# Patient Record
Sex: MALE | Race: WHITE | NOT HISPANIC OR LATINO | Employment: OTHER | URBAN - METROPOLITAN AREA
[De-identification: names, ages, dates, MRNs, and addresses within clinical notes are randomized per-mention and may not be internally consistent; named-entity substitution may affect disease eponyms.]

---

## 2017-03-21 ENCOUNTER — HISTORICAL (OUTPATIENT)
Dept: LAB | Facility: HOSPITAL | Age: 67
End: 2017-03-21

## 2018-05-22 ENCOUNTER — HISTORICAL (OUTPATIENT)
Dept: LAB | Facility: HOSPITAL | Age: 68
End: 2018-05-22

## 2018-05-22 LAB
ABS NEUT (OLG): 2.23 X10(3)/MCL (ref 2.1–9.2)
ALBUMIN SERPL-MCNC: 4.3 GM/DL (ref 3.4–5)
ALBUMIN/GLOB SERPL: 1.3 {RATIO}
ALP SERPL-CCNC: 62 UNIT/L (ref 50–136)
ALT SERPL-CCNC: 23 UNIT/L (ref 12–78)
AST SERPL-CCNC: 26 UNIT/L (ref 15–37)
BASOPHILS # BLD AUTO: 0 X10(3)/MCL (ref 0–0.2)
BASOPHILS NFR BLD AUTO: 0 %
BILIRUB SERPL-MCNC: 0.4 MG/DL (ref 0.2–1)
BILIRUBIN DIRECT+TOT PNL SERPL-MCNC: 0.1 MG/DL (ref 0–0.2)
BILIRUBIN DIRECT+TOT PNL SERPL-MCNC: 0.3 MG/DL (ref 0–0.8)
BUN SERPL-MCNC: 27 MG/DL (ref 7–18)
CALCIUM SERPL-MCNC: 8.6 MG/DL (ref 8.5–10.1)
CHLORIDE SERPL-SCNC: 105 MMOL/L (ref 98–107)
CO2 SERPL-SCNC: 28 MMOL/L (ref 21–32)
CREAT SERPL-MCNC: 1.1 MG/DL (ref 0.7–1.3)
DEPRECATED CALCIDIOL+CALCIFEROL SERPL-MC: 37 NG/ML (ref 30–80)
EOSINOPHIL # BLD AUTO: 0.2 X10(3)/MCL (ref 0–0.9)
EOSINOPHIL NFR BLD AUTO: 5 %
ERYTHROCYTE [DISTWIDTH] IN BLOOD BY AUTOMATED COUNT: 13.8 % (ref 11.5–17)
EST. AVERAGE GLUCOSE BLD GHB EST-MCNC: 111 MG/DL
GLOBULIN SER-MCNC: 3.3 GM/DL (ref 2.4–3.5)
GLUCOSE SERPL-MCNC: 111 MG/DL (ref 74–106)
HBA1C MFR BLD: 5.5 % (ref 4.2–6.3)
HCT VFR BLD AUTO: 48.3 % (ref 42–52)
HGB BLD-MCNC: 14.8 GM/DL (ref 14–18)
LYMPHOCYTES # BLD AUTO: 1.5 X10(3)/MCL (ref 0.6–4.6)
LYMPHOCYTES NFR BLD AUTO: 34 %
MCH RBC QN AUTO: 29.9 PG (ref 27–31)
MCHC RBC AUTO-ENTMCNC: 30.6 GM/DL (ref 33–36)
MCV RBC AUTO: 97.6 FL (ref 80–94)
MONOCYTES # BLD AUTO: 0.4 X10(3)/MCL (ref 0.1–1.3)
MONOCYTES NFR BLD AUTO: 10 %
NEUTROPHILS # BLD AUTO: 2.23 X10(3)/MCL (ref 2.1–9.2)
NEUTROPHILS NFR BLD AUTO: 51 %
PLATELET # BLD AUTO: 149 X10(3)/MCL (ref 130–400)
PMV BLD AUTO: 12 FL (ref 9.4–12.4)
POTASSIUM SERPL-SCNC: 4.5 MMOL/L (ref 3.5–5.1)
PROT SERPL-MCNC: 7.6 GM/DL (ref 6.4–8.2)
PSA SERPL-MCNC: 0.7 NG/ML (ref 0–4)
RBC # BLD AUTO: 4.95 X10(6)/MCL (ref 4.7–6.1)
SODIUM SERPL-SCNC: 142 MMOL/L (ref 136–145)
T4 FREE SERPL-MCNC: 0.69 NG/DL (ref 0.76–1.46)
TSH SERPL-ACNC: 3.21 MIU/L (ref 0.36–3.74)
WBC # SPEC AUTO: 4.4 X10(3)/MCL (ref 4.5–11.5)

## 2018-07-10 LAB — RAPID GROUP A STREP (OHS): NEGATIVE

## 2019-06-20 ENCOUNTER — HISTORICAL (OUTPATIENT)
Dept: LAB | Facility: HOSPITAL | Age: 69
End: 2019-06-20

## 2019-06-20 LAB
ABS NEUT (OLG): 2.43 X10(3)/MCL (ref 2.1–9.2)
ALBUMIN SERPL-MCNC: 4 GM/DL (ref 3.4–5)
ALBUMIN/GLOB SERPL: 1 {RATIO}
ALP SERPL-CCNC: 60 UNIT/L (ref 45–117)
ALT SERPL-CCNC: 24 UNIT/L (ref 16–61)
AST SERPL-CCNC: 30 UNIT/L (ref 15–37)
BASOPHILS # BLD AUTO: 0.02 X10(3)/MCL (ref 0–0.2)
BASOPHILS NFR BLD AUTO: 0.4 % (ref 0–0.9)
BILIRUB SERPL-MCNC: 0.4 MG/DL (ref 0.2–1)
BILIRUBIN DIRECT+TOT PNL SERPL-MCNC: 0.11 MG/DL (ref 0–0.2)
BILIRUBIN DIRECT+TOT PNL SERPL-MCNC: 0.29 MG/DL (ref 0–1)
BUN SERPL-MCNC: 27 MG/DL (ref 7–18)
CALCIUM SERPL-MCNC: 8.7 MG/DL (ref 8.5–10.1)
CHLORIDE SERPL-SCNC: 106 MMOL/L (ref 98–107)
CHOLEST SERPL-MCNC: 202 MG/DL (ref 0–199)
CHOLEST/HDLC SERPL: 4 MG/DL (ref 0–8)
CO2 SERPL-SCNC: 26 MMOL/L (ref 21–32)
CREAT SERPL-MCNC: 1.07 MG/DL (ref 0.7–1.3)
EOSINOPHIL # BLD AUTO: 0.36 X10(3)/MCL (ref 0–0.9)
EOSINOPHIL NFR BLD AUTO: 7.2 % (ref 0–6.5)
ERYTHROCYTE [DISTWIDTH] IN BLOOD BY AUTOMATED COUNT: 13.4 % (ref 11.5–17)
EST. AVERAGE GLUCOSE BLD GHB EST-MCNC: 100 MG/DL
GLOBULIN SER-MCNC: 4 GM/DL (ref 2–4)
GLUCOSE SERPL-MCNC: 105 MG/DL (ref 74–106)
HBA1C MFR BLD: 5.1 % (ref 4.2–6.3)
HCT VFR BLD AUTO: 45.1 % (ref 42–52)
HDLC SERPL-MCNC: 48 MG/DL
HGB BLD-MCNC: 15.4 GM/DL (ref 14–18)
IMM GRANULOCYTES # BLD AUTO: 0.01 10*3/UL (ref 0–0.02)
IMM GRANULOCYTES NFR BLD AUTO: 0.2 % (ref 0–0.43)
LDLC SERPL CALC-MCNC: 87 MG/DL (ref 0–129)
LYMPHOCYTES # BLD AUTO: 1.69 X10(3)/MCL (ref 0.6–4.6)
LYMPHOCYTES NFR BLD AUTO: 34 % (ref 16.2–38.3)
MCH RBC QN AUTO: 32.5 PG (ref 27–31)
MCHC RBC AUTO-ENTMCNC: 34.1 GM/DL (ref 33–36)
MCV RBC AUTO: 95.1 FL (ref 80–94)
MONOCYTES # BLD AUTO: 0.46 X10(3)/MCL (ref 0.1–1.3)
MONOCYTES NFR BLD AUTO: 9.3 % (ref 4.7–11.3)
NEUTROPHILS # BLD AUTO: 2.43 X10(3)/MCL (ref 2.1–9.2)
NEUTROPHILS NFR BLD AUTO: 48.9 % (ref 49.1–73.4)
NRBC BLD AUTO-RTO: 0 % (ref 0–0.2)
PLATELET # BLD AUTO: 179 X10(3)/MCL (ref 130–400)
PMV BLD AUTO: 10.6 FL (ref 7.4–10.4)
POTASSIUM SERPL-SCNC: 4.6 MMOL/L (ref 3.5–5.1)
PROT SERPL-MCNC: 7.9 GM/DL (ref 6.4–8.2)
RBC # BLD AUTO: 4.74 X10(6)/MCL (ref 4.7–6.1)
SODIUM SERPL-SCNC: 139 MMOL/L (ref 136–145)
TRIGL SERPL-MCNC: 337 MG/DL (ref 0–149)
TSH SERPL-ACNC: 1.58 MIU/ML (ref 0.36–3.74)
VLDLC SERPL CALC-MCNC: 67 MG/DL
WBC # SPEC AUTO: 5 X10(3)/MCL (ref 4.5–11.5)

## 2020-06-18 ENCOUNTER — HISTORICAL (OUTPATIENT)
Dept: LAB | Facility: HOSPITAL | Age: 70
End: 2020-06-18

## 2020-06-18 LAB
ABS NEUT (OLG): 2.87 X10(3)/MCL (ref 2.1–9.2)
ALBUMIN SERPL-MCNC: 4.1 GM/DL (ref 3.4–4.8)
ALBUMIN/GLOB SERPL: 1.2 RATIO (ref 1.1–2)
ALP SERPL-CCNC: 62 UNIT/L (ref 40–150)
ALT SERPL-CCNC: 19 UNIT/L (ref 0–55)
AST SERPL-CCNC: 28 UNIT/L (ref 5–34)
BASOPHILS # BLD AUTO: 0.02 X10(3)/MCL (ref 0–0.2)
BASOPHILS NFR BLD AUTO: 0.4 % (ref 0–0.9)
BILIRUB SERPL-MCNC: 0.5 MG/DL (ref 0.2–1.2)
BILIRUBIN DIRECT+TOT PNL SERPL-MCNC: 0.2 MG/DL (ref 0–0.5)
BILIRUBIN DIRECT+TOT PNL SERPL-MCNC: 0.3 MG/DL (ref 0–0.8)
BUN SERPL-MCNC: 22.5 MG/DL (ref 8.4–25.7)
CALCIUM SERPL-MCNC: 9.1 MG/DL (ref 8.8–10)
CHLORIDE SERPL-SCNC: 101 MMOL/L (ref 98–107)
CHOLEST SERPL-MCNC: 168 MG/DL
CHOLEST/HDLC SERPL: 4 {RATIO} (ref 0–5)
CO2 SERPL-SCNC: 26 MMOL/L (ref 23–31)
CREAT SERPL-MCNC: 0.95 MG/DL (ref 0.72–1.25)
DEPRECATED CALCIDIOL+CALCIFEROL SERPL-MC: 49.9 NG/ML (ref 6.6–49.9)
EOSINOPHIL # BLD AUTO: 0.25 X10(3)/MCL (ref 0–0.9)
EOSINOPHIL NFR BLD AUTO: 5.3 % (ref 0–6.5)
ERYTHROCYTE [DISTWIDTH] IN BLOOD BY AUTOMATED COUNT: 12.6 % (ref 11.5–17)
EST. AVERAGE GLUCOSE BLD GHB EST-MCNC: 111.2 MG/DL
GLOBULIN SER-MCNC: 3.5 GM/DL (ref 2.4–3.5)
GLUCOSE SERPL-MCNC: 118 MG/DL (ref 82–115)
HBA1C MFR BLD: 5.5 %
HCT VFR BLD AUTO: 43.7 % (ref 42–52)
HDLC SERPL-MCNC: 45 MG/DL (ref 40–60)
HGB BLD-MCNC: 14.8 GM/DL (ref 14–18)
IMM GRANULOCYTES # BLD AUTO: 0 10*3/UL (ref 0–0.02)
IMM GRANULOCYTES NFR BLD AUTO: 0 % (ref 0–0.43)
LDLC SERPL CALC-MCNC: 89 MG/DL (ref 50–140)
LYMPHOCYTES # BLD AUTO: 1.29 X10(3)/MCL (ref 0.6–4.6)
LYMPHOCYTES NFR BLD AUTO: 27.2 % (ref 16.2–38.3)
MCH RBC QN AUTO: 31.4 PG (ref 27–31)
MCHC RBC AUTO-ENTMCNC: 33.9 GM/DL (ref 33–36)
MCV RBC AUTO: 92.8 FL (ref 80–94)
MONOCYTES # BLD AUTO: 0.31 X10(3)/MCL (ref 0.1–1.3)
MONOCYTES NFR BLD AUTO: 6.5 % (ref 4.7–11.3)
NEUTROPHILS # BLD AUTO: 2.87 X10(3)/MCL (ref 2.1–9.2)
NEUTROPHILS NFR BLD AUTO: 60.6 % (ref 49.1–73.4)
NRBC BLD AUTO-RTO: 0 % (ref 0–0.2)
PLATELET # BLD AUTO: 162 X10(3)/MCL (ref 130–400)
PMV BLD AUTO: 10.7 FL (ref 7.4–10.4)
POTASSIUM SERPL-SCNC: 4.2 MMOL/L (ref 3.5–5.1)
PROT SERPL-MCNC: 7.6 GM/DL (ref 5.8–7.6)
PSA SERPL-MCNC: 0.36 NG/ML
RBC # BLD AUTO: 4.71 X10(6)/MCL (ref 4.7–6.1)
SODIUM SERPL-SCNC: 140 MMOL/L (ref 136–145)
TRIGL SERPL-MCNC: 169 MG/DL (ref 0–150)
TSH SERPL-ACNC: 1.7 UIU/ML (ref 0.35–4.94)
VLDLC SERPL CALC-MCNC: 34 MG/DL
WBC # SPEC AUTO: 4.7 X10(3)/MCL (ref 4.5–11.5)

## 2021-07-01 ENCOUNTER — HISTORICAL (OUTPATIENT)
Dept: LAB | Facility: HOSPITAL | Age: 71
End: 2021-07-01

## 2021-07-01 LAB
ABS NEUT (OLG): 3.17 X10(3)/MCL (ref 2.1–9.2)
ALBUMIN SERPL-MCNC: 4.4 GM/DL (ref 3.4–4.8)
ALBUMIN/GLOB SERPL: 1.3 RATIO (ref 1.1–2)
ALP SERPL-CCNC: 59 UNIT/L (ref 40–150)
ALT SERPL-CCNC: 11 UNIT/L (ref 0–55)
AST SERPL-CCNC: 22 UNIT/L (ref 5–34)
BASOPHILS # BLD AUTO: 0.01 X10(3)/MCL (ref 0–0.2)
BASOPHILS NFR BLD AUTO: 0.2 % (ref 0–0.9)
BILIRUB SERPL-MCNC: 0.6 MG/DL (ref 0.2–1.2)
BILIRUBIN DIRECT+TOT PNL SERPL-MCNC: 0.2 MG/DL (ref 0–0.5)
BILIRUBIN DIRECT+TOT PNL SERPL-MCNC: 0.4 MG/DL (ref 0–0.8)
BUN SERPL-MCNC: 12.4 MG/DL (ref 8.4–25.7)
CALCIUM SERPL-MCNC: 9.8 MG/DL (ref 8.8–10)
CHLORIDE SERPL-SCNC: 103 MMOL/L (ref 98–107)
CHOLEST SERPL-MCNC: 157 MG/DL
CHOLEST/HDLC SERPL: 3 {RATIO} (ref 0–5)
CO2 SERPL-SCNC: 27 MMOL/L (ref 23–31)
CREAT SERPL-MCNC: 0.93 MG/DL (ref 0.72–1.25)
DEPRECATED CALCIDIOL+CALCIFEROL SERPL-MC: 28.5 NG/ML (ref 30–80)
EOSINOPHIL # BLD AUTO: 0.4 X10(3)/MCL (ref 0–0.9)
EOSINOPHIL NFR BLD AUTO: 6.7 % (ref 0–6.5)
ERYTHROCYTE [DISTWIDTH] IN BLOOD BY AUTOMATED COUNT: 12.4 % (ref 11.5–17)
EST. AVERAGE GLUCOSE BLD GHB EST-MCNC: 102.5 MG/DL
GLOBULIN SER-MCNC: 3.3 GM/DL (ref 2.4–3.5)
GLUCOSE SERPL-MCNC: 104 MG/DL (ref 82–115)
HBA1C MFR BLD: 5.2 %
HCT VFR BLD AUTO: 45.7 % (ref 42–52)
HDLC SERPL-MCNC: 53 MG/DL (ref 40–60)
HGB BLD-MCNC: 15.3 GM/DL (ref 14–18)
IMM GRANULOCYTES # BLD AUTO: 0.01 10*3/UL (ref 0–0.02)
IMM GRANULOCYTES NFR BLD AUTO: 0.2 % (ref 0–0.43)
LDLC SERPL CALC-MCNC: 87 MG/DL (ref 50–140)
LYMPHOCYTES # BLD AUTO: 1.92 X10(3)/MCL (ref 0.6–4.6)
LYMPHOCYTES NFR BLD AUTO: 32.1 % (ref 16.2–38.3)
MCH RBC QN AUTO: 31.7 PG (ref 27–31)
MCHC RBC AUTO-ENTMCNC: 33.5 GM/DL (ref 33–36)
MCV RBC AUTO: 94.8 FL (ref 80–94)
MONOCYTES # BLD AUTO: 0.48 X10(3)/MCL (ref 0.1–1.3)
MONOCYTES NFR BLD AUTO: 8 % (ref 4.7–11.3)
NEUTROPHILS # BLD AUTO: 3.17 X10(3)/MCL (ref 2.1–9.2)
NEUTROPHILS NFR BLD AUTO: 52.8 % (ref 49.1–73.4)
NRBC BLD AUTO-RTO: 0 % (ref 0–0.2)
PLATELET # BLD AUTO: 204 X10(3)/MCL (ref 130–400)
PMV BLD AUTO: 10.2 FL (ref 7.4–10.4)
POTASSIUM SERPL-SCNC: 4 MMOL/L (ref 3.5–5.1)
PROT SERPL-MCNC: 7.7 GM/DL (ref 5.8–7.6)
PSA SERPL-MCNC: 0.49 NG/ML
RBC # BLD AUTO: 4.82 X10(6)/MCL (ref 4.7–6.1)
SODIUM SERPL-SCNC: 142 MMOL/L (ref 136–145)
TRIGL SERPL-MCNC: 86 MG/DL (ref 0–150)
TSH SERPL-ACNC: 1.79 UIU/ML (ref 0.35–4.94)
VLDLC SERPL CALC-MCNC: 17 MG/DL
WBC # SPEC AUTO: 6 X10(3)/MCL (ref 4.5–11.5)

## 2022-04-10 ENCOUNTER — HISTORICAL (OUTPATIENT)
Dept: ADMINISTRATIVE | Facility: HOSPITAL | Age: 72
End: 2022-04-10

## 2022-04-27 VITALS
SYSTOLIC BLOOD PRESSURE: 190 MMHG | HEIGHT: 66 IN | DIASTOLIC BLOOD PRESSURE: 94 MMHG | WEIGHT: 156.06 LBS | OXYGEN SATURATION: 97 % | BODY MASS INDEX: 25.08 KG/M2

## 2022-06-01 ENCOUNTER — TELEPHONE (OUTPATIENT)
Dept: ADMINISTRATIVE | Facility: HOSPITAL | Age: 72
End: 2022-06-01
Payer: MEDICARE

## 2022-06-09 ENCOUNTER — OFFICE VISIT (OUTPATIENT)
Dept: FAMILY MEDICINE | Facility: CLINIC | Age: 72
End: 2022-06-09
Payer: MEDICARE

## 2022-06-09 VITALS
OXYGEN SATURATION: 97 % | HEIGHT: 65 IN | BODY MASS INDEX: 25.49 KG/M2 | WEIGHT: 153 LBS | RESPIRATION RATE: 16 BRPM | SYSTOLIC BLOOD PRESSURE: 124 MMHG | TEMPERATURE: 98 F | HEART RATE: 76 BPM | DIASTOLIC BLOOD PRESSURE: 74 MMHG

## 2022-06-09 DIAGNOSIS — Z09 HOSPITAL DISCHARGE FOLLOW-UP: Primary | ICD-10-CM

## 2022-06-09 DIAGNOSIS — S22.42XA CLOSED FRACTURE OF MULTIPLE RIBS OF LEFT SIDE, INITIAL ENCOUNTER: ICD-10-CM

## 2022-06-09 PROCEDURE — 3078F DIAST BP <80 MM HG: CPT | Mod: CPTII,,, | Performed by: NURSE PRACTITIONER

## 2022-06-09 PROCEDURE — 1101F PT FALLS ASSESS-DOCD LE1/YR: CPT | Mod: CPTII,,, | Performed by: NURSE PRACTITIONER

## 2022-06-09 PROCEDURE — 99213 OFFICE O/P EST LOW 20 MIN: CPT | Mod: ,,, | Performed by: NURSE PRACTITIONER

## 2022-06-09 PROCEDURE — 1126F PR PAIN SEVERITY QUANTIFIED, NO PAIN PRESENT: ICD-10-PCS | Mod: CPTII,,, | Performed by: NURSE PRACTITIONER

## 2022-06-09 PROCEDURE — 99213 PR OFFICE/OUTPT VISIT, EST, LEVL III, 20-29 MIN: ICD-10-PCS | Mod: ,,, | Performed by: NURSE PRACTITIONER

## 2022-06-09 PROCEDURE — 1101F PR PT FALLS ASSESS DOC 0-1 FALLS W/OUT INJ PAST YR: ICD-10-PCS | Mod: CPTII,,, | Performed by: NURSE PRACTITIONER

## 2022-06-09 PROCEDURE — 1159F PR MEDICATION LIST DOCUMENTED IN MEDICAL RECORD: ICD-10-PCS | Mod: CPTII,,, | Performed by: NURSE PRACTITIONER

## 2022-06-09 PROCEDURE — 3074F SYST BP LT 130 MM HG: CPT | Mod: CPTII,,, | Performed by: NURSE PRACTITIONER

## 2022-06-09 PROCEDURE — 3008F PR BODY MASS INDEX (BMI) DOCUMENTED: ICD-10-PCS | Mod: CPTII,,, | Performed by: NURSE PRACTITIONER

## 2022-06-09 PROCEDURE — 3078F PR MOST RECENT DIASTOLIC BLOOD PRESSURE < 80 MM HG: ICD-10-PCS | Mod: CPTII,,, | Performed by: NURSE PRACTITIONER

## 2022-06-09 PROCEDURE — 3288F PR FALLS RISK ASSESSMENT DOCUMENTED: ICD-10-PCS | Mod: CPTII,,, | Performed by: NURSE PRACTITIONER

## 2022-06-09 PROCEDURE — 4010F PR ACE/ARB THEARPY RXD/TAKEN: ICD-10-PCS | Mod: CPTII,,, | Performed by: NURSE PRACTITIONER

## 2022-06-09 PROCEDURE — 1126F AMNT PAIN NOTED NONE PRSNT: CPT | Mod: CPTII,,, | Performed by: NURSE PRACTITIONER

## 2022-06-09 PROCEDURE — 1159F MED LIST DOCD IN RCRD: CPT | Mod: CPTII,,, | Performed by: NURSE PRACTITIONER

## 2022-06-09 PROCEDURE — 4010F ACE/ARB THERAPY RXD/TAKEN: CPT | Mod: CPTII,,, | Performed by: NURSE PRACTITIONER

## 2022-06-09 PROCEDURE — 3074F PR MOST RECENT SYSTOLIC BLOOD PRESSURE < 130 MM HG: ICD-10-PCS | Mod: CPTII,,, | Performed by: NURSE PRACTITIONER

## 2022-06-09 PROCEDURE — 3008F BODY MASS INDEX DOCD: CPT | Mod: CPTII,,, | Performed by: NURSE PRACTITIONER

## 2022-06-09 PROCEDURE — 3288F FALL RISK ASSESSMENT DOCD: CPT | Mod: CPTII,,, | Performed by: NURSE PRACTITIONER

## 2022-06-09 RX ORDER — ASPIRIN 81 MG/1
81 TABLET ORAL DAILY
COMMUNITY

## 2022-06-09 RX ORDER — ATORVASTATIN CALCIUM 10 MG/1
10 TABLET, FILM COATED ORAL DAILY
COMMUNITY

## 2022-06-09 RX ORDER — IBUPROFEN 800 MG/1
800 TABLET ORAL EVERY 8 HOURS PRN
Qty: 30 TABLET | Refills: 0 | Status: SHIPPED | OUTPATIENT
Start: 2022-06-09 | End: 2022-07-08

## 2022-06-09 RX ORDER — NIACIN 1000 MG/1
1000 TABLET, EXTENDED RELEASE ORAL NIGHTLY
COMMUNITY
Start: 2022-05-23 | End: 2022-10-25 | Stop reason: SDUPTHER

## 2022-06-09 RX ORDER — AMOXICILLIN 500 MG
4 CAPSULE ORAL DAILY
COMMUNITY

## 2022-06-09 RX ORDER — LOSARTAN POTASSIUM 50 MG/1
1 TABLET ORAL DAILY
COMMUNITY
Start: 2022-06-01 | End: 2022-07-01

## 2022-06-09 RX ORDER — GEMFIBROZIL 600 MG/1
600 TABLET, FILM COATED ORAL 2 TIMES DAILY
COMMUNITY
Start: 2021-08-31 | End: 2022-10-25 | Stop reason: SDUPTHER

## 2022-06-09 RX ORDER — DIAPER,BRIEF,INFANT-TODD,DISP
1 EACH MISCELLANEOUS DAILY
COMMUNITY

## 2022-06-09 RX ORDER — METOPROLOL SUCCINATE 25 MG/1
25 TABLET, EXTENDED RELEASE ORAL DAILY
COMMUNITY
Start: 2022-05-23 | End: 2022-10-03 | Stop reason: SDUPTHER

## 2022-06-09 RX ORDER — HYDROGEN PEROXIDE 3 %
20 SOLUTION, NON-ORAL MISCELLANEOUS DAILY
COMMUNITY

## 2022-06-09 RX ORDER — LIDOCAINE 560 MG/1
1 PATCH PERCUTANEOUS; TOPICAL; TRANSDERMAL
COMMUNITY
Start: 2022-06-01 | End: 2022-07-08

## 2022-06-09 RX ORDER — PAROXETINE HYDROCHLORIDE 20 MG/1
20 TABLET, FILM COATED ORAL DAILY
COMMUNITY
Start: 2021-09-16 | End: 2023-06-15 | Stop reason: SDUPTHER

## 2022-06-09 NOTE — ASSESSMENT & PLAN NOTE
Pain much improved  Continue IS q 2 hours  Rx Ibuprofen prn; take with food  Report to ED for any CP or SOB  Keep appt with PCP for follow up  Verbalizes understanding

## 2022-06-09 NOTE — ASSESSMENT & PLAN NOTE
S/P fractured left ribs  Med rec completed  Continue IS q 2 hours  Rx Ibuprofen prn; take with food  Report to ED for any CP or SOB  Keep appt with PCP for follow up  Verbalizes understanding

## 2022-06-09 NOTE — PROGRESS NOTES
Subjective:      Patient ID: Joao Ignacio Jr is a 72 y.o. White male      Chief Complaint: Follow-up (Hosp f/u, fall w/ broken ribs- 11 days)       Past Medical History:   Diagnosis Date    Acid reflux     CAD (coronary artery disease)     Carotid stenosis     Depression     History of CVA (cerebrovascular accident)     History of MI (myocardial infarction)     Hyperlipidemia     Hypertension     Obesity, unspecified     Vitamin D deficiency         HPI  Presents to clinic for hospital discharge follow-up.    Hospital Course: Presented to [facility] with left chest wall pain and dyspnea following a fall.  States he was off-balance and fell.  Denies any dizziness, syncope, or CP.  CT head without any acute abnormalities. CT of the chest showed multiple left-sided rib fractures with displaced seventh and eighth ribs, left basilar atelectasis, and underlying emphysema.  Pain was controlled and he was given supplemental oxygen and encouraged to use IS.  Repeat CXR 5/31/22 was stable. Discharged home in stable condition.  Rx given for Norco for pain control at discharge.  Overall, doing well.  States pain has improved.  He is not taking Norco, but desires rx for Ibuprofen instead.  States using Incentive Spirometry q 2 hours.  Denies any CP or SOB.  Denies any other problems.        Review of Systems   Constitutional: Negative.  Negative for chills, fatigue, fever and unexpected weight change.   HENT: Negative.    Eyes: Negative.    Respiratory: Negative.  Negative for apnea, chest tightness, shortness of breath and wheezing.    Cardiovascular: Negative.  Negative for chest pain.   Gastrointestinal: Negative.    Endocrine: Negative.    Musculoskeletal: Positive for arthralgias (rib pan).   Allergic/Immunologic: Negative.    Neurological: Negative.  Negative for weakness.   Hematological: Negative.    Psychiatric/Behavioral: Negative.    All other systems reviewed and are negative.         Objective:      Vitals:     06/09/22 1332   BP: 124/74   Pulse: 76   Resp: 16   Temp: 98 °F (36.7 °C)      Body mass index is 25.46 kg/m².     Physical Exam  Vitals reviewed.   Constitutional:       Appearance: He is not toxic-appearing.   HENT:      Head: Normocephalic.      Mouth/Throat:      Mouth: Mucous membranes are moist.   Eyes:      Extraocular Movements: Extraocular movements intact.      Pupils: Pupils are equal, round, and reactive to light.   Cardiovascular:      Rate and Rhythm: Normal rate and regular rhythm.      Pulses: Normal pulses.      Heart sounds: Normal heart sounds.   Pulmonary:      Effort: Pulmonary effort is normal. No respiratory distress.      Breath sounds: Normal breath sounds.   Abdominal:      General: Bowel sounds are normal. There is no distension.      Palpations: Abdomen is soft.      Tenderness: There is no abdominal tenderness.   Musculoskeletal:         General: No tenderness. Normal range of motion.      Cervical back: Neck supple.   Skin:     General: Skin is warm and dry.      Findings: Bruising (brusing left anterior thorax) present.   Neurological:      Mental Status: He is alert and oriented to person, place, and time.   Psychiatric:         Mood and Affect: Mood normal.            Current Outpatient Medications:     aspirin (ECOTRIN) 81 MG EC tablet, Take 81 mg by mouth once daily., Disp: , Rfl:     atorvastatin (LIPITOR) 10 MG tablet, Take 10 mg by mouth once daily at 6am., Disp: , Rfl:     calcium citrate-vitamin D3 250 mg-5 mcg (200 unit) Tab, Take 1 tablet by mouth once daily., Disp: , Rfl:     esomeprazole (NEXIUM) 20 MG capsule, Take 20 mg by mouth once daily., Disp: , Rfl:     gemfibroziL (LOPID) 600 MG tablet, Take 600 mg by mouth 2 (two) times a day., Disp: , Rfl:     LIDOcaine 4 % PtMd, Apply 1 patch topically., Disp: , Rfl:     losartan (COZAAR) 50 MG tablet, Take 1 tablet by mouth once daily., Disp: , Rfl:     metoprolol succinate (TOPROL-XL) 25 MG 24 hr tablet, Take 25 mg by  mouth once daily. for 90 days, Disp: , Rfl:     niacin (NIASPAN) 1000 MG CR tablet, Take 1,000 mg by mouth nightly., Disp: , Rfl:     omega-3 fatty acids/fish oil (FISH OIL-OMEGA-3 FATTY ACIDS) 300-1,000 mg capsule, Take 4 g by mouth once daily at 6am., Disp: , Rfl:     paroxetine (PAXIL) 20 MG tablet, Take 20 mg by mouth once daily., Disp: , Rfl:   No current facility-administered medications for this visit.    Assessment & Plan:     Problem List Items Addressed This Visit        Orthopedic    Multiple closed fractures of ribs of left side     Pain much improved  Continue IS q 2 hours  Rx Ibuprofen prn; take with food  Report to ED for any CP or SOB  Keep appt with PCP for follow up  Verbalizes understanding                Other    Hospital discharge follow-up - Primary     S/P fractured left ribs  Med rec completed  Continue IS q 2 hours  Rx Ibuprofen prn; take with food  Report to ED for any CP or SOB  Keep appt with PCP for follow up  Verbalizes understanding

## 2022-06-29 ENCOUNTER — TELEPHONE (OUTPATIENT)
Dept: FAMILY MEDICINE | Facility: CLINIC | Age: 72
End: 2022-06-29
Payer: MEDICARE

## 2022-06-29 DIAGNOSIS — E66.9 OBESITY, UNSPECIFIED CLASSIFICATION, UNSPECIFIED OBESITY TYPE, UNSPECIFIED WHETHER SERIOUS COMORBIDITY PRESENT: ICD-10-CM

## 2022-06-29 DIAGNOSIS — E78.5 HYPERLIPIDEMIA, UNSPECIFIED HYPERLIPIDEMIA TYPE: ICD-10-CM

## 2022-06-29 DIAGNOSIS — Z83.3 FAMILY HISTORY OF DIABETES MELLITUS (DM): ICD-10-CM

## 2022-06-29 DIAGNOSIS — I10 HYPERTENSION, UNSPECIFIED TYPE: ICD-10-CM

## 2022-06-29 DIAGNOSIS — Z00.00 WELLNESS EXAMINATION: Primary | ICD-10-CM

## 2022-06-30 ENCOUNTER — LAB VISIT (OUTPATIENT)
Dept: LAB | Facility: HOSPITAL | Age: 72
End: 2022-06-30
Attending: FAMILY MEDICINE
Payer: MEDICARE

## 2022-06-30 DIAGNOSIS — Z00.00 WELLNESS EXAMINATION: ICD-10-CM

## 2022-06-30 DIAGNOSIS — Z83.3 FAMILY HISTORY OF DIABETES MELLITUS (DM): ICD-10-CM

## 2022-06-30 DIAGNOSIS — E78.5 HYPERLIPIDEMIA, UNSPECIFIED HYPERLIPIDEMIA TYPE: ICD-10-CM

## 2022-06-30 DIAGNOSIS — E66.9 OBESITY, UNSPECIFIED CLASSIFICATION, UNSPECIFIED OBESITY TYPE, UNSPECIFIED WHETHER SERIOUS COMORBIDITY PRESENT: ICD-10-CM

## 2022-06-30 DIAGNOSIS — I10 HYPERTENSION, UNSPECIFIED TYPE: ICD-10-CM

## 2022-06-30 LAB
APPEARANCE UR: CLEAR
BILIRUB UR QL STRIP.AUTO: NEGATIVE MG/DL
COLOR UR AUTO: YELLOW
GLUCOSE UR QL STRIP.AUTO: NEGATIVE MG/DL
KETONES UR QL STRIP.AUTO: NEGATIVE MG/DL
LEUKOCYTE ESTERASE UR QL STRIP.AUTO: NEGATIVE UNIT/L
NITRITE UR QL STRIP.AUTO: NEGATIVE
PH UR STRIP.AUTO: 6 [PH]
PROT UR QL STRIP.AUTO: NEGATIVE MG/DL
RBC UR QL AUTO: NEGATIVE UNIT/L
SP GR UR STRIP.AUTO: 1.01
UROBILINOGEN UR STRIP-ACNC: 0.2 MG/DL

## 2022-06-30 PROCEDURE — 81003 URINALYSIS AUTO W/O SCOPE: CPT

## 2022-07-08 ENCOUNTER — OFFICE VISIT (OUTPATIENT)
Dept: FAMILY MEDICINE | Facility: CLINIC | Age: 72
End: 2022-07-08
Payer: MEDICARE

## 2022-07-08 ENCOUNTER — LAB VISIT (OUTPATIENT)
Dept: LAB | Facility: HOSPITAL | Age: 72
End: 2022-07-08
Attending: FAMILY MEDICINE
Payer: MEDICARE

## 2022-07-08 VITALS
SYSTOLIC BLOOD PRESSURE: 138 MMHG | OXYGEN SATURATION: 97 % | HEART RATE: 72 BPM | HEIGHT: 65 IN | WEIGHT: 161.13 LBS | BODY MASS INDEX: 26.85 KG/M2 | TEMPERATURE: 97 F | DIASTOLIC BLOOD PRESSURE: 86 MMHG | RESPIRATION RATE: 16 BRPM

## 2022-07-08 DIAGNOSIS — K21.9 GASTROESOPHAGEAL REFLUX DISEASE, UNSPECIFIED WHETHER ESOPHAGITIS PRESENT: ICD-10-CM

## 2022-07-08 DIAGNOSIS — R35.0 URINE FREQUENCY: ICD-10-CM

## 2022-07-08 DIAGNOSIS — I10 HYPERTENSION, UNSPECIFIED TYPE: ICD-10-CM

## 2022-07-08 DIAGNOSIS — F32.A DEPRESSIVE DISORDER: ICD-10-CM

## 2022-07-08 DIAGNOSIS — S22.42XS CLOSED FRACTURE OF MULTIPLE RIBS OF LEFT SIDE, SEQUELA: ICD-10-CM

## 2022-07-08 DIAGNOSIS — Z12.5 PROSTATE CANCER SCREENING: ICD-10-CM

## 2022-07-08 DIAGNOSIS — Z00.00 MEDICARE ANNUAL WELLNESS VISIT, SUBSEQUENT: Primary | ICD-10-CM

## 2022-07-08 DIAGNOSIS — Z71.89 ADVANCED CARE PLANNING/COUNSELING DISCUSSION: ICD-10-CM

## 2022-07-08 DIAGNOSIS — E78.5 HYPERLIPIDEMIA, UNSPECIFIED HYPERLIPIDEMIA TYPE: ICD-10-CM

## 2022-07-08 DIAGNOSIS — R79.89 LOW VITAMIN D LEVEL: ICD-10-CM

## 2022-07-08 DIAGNOSIS — I25.10 CORONARY ARTERY DISEASE, UNSPECIFIED VESSEL OR LESION TYPE, UNSPECIFIED WHETHER ANGINA PRESENT, UNSPECIFIED WHETHER NATIVE OR TRANSPLANTED HEART: ICD-10-CM

## 2022-07-08 DIAGNOSIS — E66.9 OBESITY, UNSPECIFIED CLASSIFICATION, UNSPECIFIED OBESITY TYPE, UNSPECIFIED WHETHER SERIOUS COMORBIDITY PRESENT: ICD-10-CM

## 2022-07-08 DIAGNOSIS — Z00.00 MEDICARE ANNUAL WELLNESS VISIT, SUBSEQUENT: ICD-10-CM

## 2022-07-08 PROBLEM — I25.2 HISTORY OF MYOCARDIAL INFARCTION: Status: ACTIVE | Noted: 2022-07-08

## 2022-07-08 PROBLEM — I65.29 STENOSIS OF CAROTID ARTERY: Status: ACTIVE | Noted: 2022-07-08

## 2022-07-08 PROBLEM — Z83.3 FAMILY HISTORY OF DIABETES MELLITUS: Status: ACTIVE | Noted: 2022-07-08

## 2022-07-08 LAB
HCV AB SERPL QL IA: NONREACTIVE
PSA SERPL-MCNC: 0.54 NG/ML

## 2022-07-08 PROCEDURE — 86803 HEPATITIS C AB TEST: CPT

## 2022-07-08 PROCEDURE — 3008F BODY MASS INDEX DOCD: CPT | Mod: CPTII,,, | Performed by: FAMILY MEDICINE

## 2022-07-08 PROCEDURE — G0439 PPPS, SUBSEQ VISIT: HCPCS | Mod: ,,, | Performed by: FAMILY MEDICINE

## 2022-07-08 PROCEDURE — 3288F PR FALLS RISK ASSESSMENT DOCUMENTED: ICD-10-PCS | Mod: CPTII,,, | Performed by: FAMILY MEDICINE

## 2022-07-08 PROCEDURE — 1126F PR PAIN SEVERITY QUANTIFIED, NO PAIN PRESENT: ICD-10-PCS | Mod: CPTII,,, | Performed by: FAMILY MEDICINE

## 2022-07-08 PROCEDURE — 1100F PTFALLS ASSESS-DOCD GE2>/YR: CPT | Mod: CPTII,,, | Performed by: FAMILY MEDICINE

## 2022-07-08 PROCEDURE — G0439 PR MEDICARE ANNUAL WELLNESS SUBSEQUENT VISIT: ICD-10-PCS | Mod: ,,, | Performed by: FAMILY MEDICINE

## 2022-07-08 PROCEDURE — 3008F PR BODY MASS INDEX (BMI) DOCUMENTED: ICD-10-PCS | Mod: CPTII,,, | Performed by: FAMILY MEDICINE

## 2022-07-08 PROCEDURE — 1126F AMNT PAIN NOTED NONE PRSNT: CPT | Mod: CPTII,,, | Performed by: FAMILY MEDICINE

## 2022-07-08 PROCEDURE — 99497 PR ADVNCD CARE PLAN 30 MIN: ICD-10-PCS | Mod: ,,, | Performed by: FAMILY MEDICINE

## 2022-07-08 PROCEDURE — 4010F ACE/ARB THERAPY RXD/TAKEN: CPT | Mod: CPTII,,, | Performed by: FAMILY MEDICINE

## 2022-07-08 PROCEDURE — 1100F PR PT FALLS ASSESS DOC 2+ FALLS/FALL W/INJURY/YR: ICD-10-PCS | Mod: CPTII,,, | Performed by: FAMILY MEDICINE

## 2022-07-08 PROCEDURE — 84153 ASSAY OF PSA TOTAL: CPT

## 2022-07-08 PROCEDURE — 3079F DIAST BP 80-89 MM HG: CPT | Mod: CPTII,,, | Performed by: FAMILY MEDICINE

## 2022-07-08 PROCEDURE — 3075F PR MOST RECENT SYSTOLIC BLOOD PRESS GE 130-139MM HG: ICD-10-PCS | Mod: CPTII,,, | Performed by: FAMILY MEDICINE

## 2022-07-08 PROCEDURE — 99497 ADVNCD CARE PLAN 30 MIN: CPT | Mod: ,,, | Performed by: FAMILY MEDICINE

## 2022-07-08 PROCEDURE — 3288F FALL RISK ASSESSMENT DOCD: CPT | Mod: CPTII,,, | Performed by: FAMILY MEDICINE

## 2022-07-08 PROCEDURE — 4010F PR ACE/ARB THEARPY RXD/TAKEN: ICD-10-PCS | Mod: CPTII,,, | Performed by: FAMILY MEDICINE

## 2022-07-08 PROCEDURE — 1159F PR MEDICATION LIST DOCUMENTED IN MEDICAL RECORD: ICD-10-PCS | Mod: CPTII,,, | Performed by: FAMILY MEDICINE

## 2022-07-08 PROCEDURE — 3075F SYST BP GE 130 - 139MM HG: CPT | Mod: CPTII,,, | Performed by: FAMILY MEDICINE

## 2022-07-08 PROCEDURE — 1159F MED LIST DOCD IN RCRD: CPT | Mod: CPTII,,, | Performed by: FAMILY MEDICINE

## 2022-07-08 PROCEDURE — 3079F PR MOST RECENT DIASTOLIC BLOOD PRESSURE 80-89 MM HG: ICD-10-PCS | Mod: CPTII,,, | Performed by: FAMILY MEDICINE

## 2022-07-08 PROCEDURE — 36415 COLL VENOUS BLD VENIPUNCTURE: CPT

## 2022-07-08 RX ORDER — LOSARTAN POTASSIUM 50 MG/1
50 TABLET ORAL DAILY
COMMUNITY
End: 2022-12-09 | Stop reason: SDUPTHER

## 2022-07-08 NOTE — ASSESSMENT & PLAN NOTE
Advanced care planning discussed and paperwork given.    I attest that I have had a face to face discussion with patient and or surrogate decision maker.   Included surrogate decision maker: NO  Advanced directive in chart: NO  LAPOST: NO    Total time spent: 16 minutes

## 2022-07-08 NOTE — ASSESSMENT & PLAN NOTE
Will get psa today. ua 6/30/22 negative.  If psa wnl, will try flomax.  Discussed decreasing fluid intake by 8pm.  Avoid coffee late in afternoon

## 2022-07-08 NOTE — PROGRESS NOTES
Subjective:        Patient ID: Joao Ignacio Jr is a 72 y.o. male.    Chief Complaint: Wellness  (Patient fell about a month ago broke 4 ribs, stated he is healing well. ) and Urinary Frequency      presents to the clinic unaccompanied for his wellness visit. he did labs already.  they were reviewed with the patient at the time of his appt today. psa and hep c screening not done. Will order. Will do today    Fell backward and hit end of his bed. Went to [facility] at [facility].  xrayed and was told he broke 4 ribs. Was admitted to [facility] 5/30-6/1/22.  He was discharged home on norco. Only took 1.  Not on any pain meds now.  Not on oxygen.   He is now ambulating with cane.  No more falls.  He is on calcium and vitamin d.     Notes a weak urine stream.  Urine frequency. Worst at night.     Patient has a history of hypertension, CAD, carotid stenosis as well as history of MI. He reports he saw Dr. Vaughan for right carotid artery occlusion. lov 11/12/2020 and was  widely patent. he has follow up in 1 year. He monitors his blood pressures at home and states they are normally 130s/70s. He takes his metoprolol BID and his losartan 25mg in the morning from dr. samayoa. He also has a history of hyperlipidemia and is on atorvastatin, fish oil, niacin, and gemfibrozil. He is on a baby aspirin. his cardiologist Dr. Samayoa. he sees him annually. Has an appointment this month.     He also has a history of mini strokes after his heart attack. He is no longer on Depakote or valproic acid. he stopped valproic acid in august (weaned himself off). denies tremors. feeling well. He saw Dr. Clifford. does not have scheduled follow up.    He also has a history of depression and is on Paxil. He is currently taking half of a 20 mg tablet (10mg) daily. He is happy with his current dosing. He states that the higher dose  made him jittery.    He has a history of acid reflux and is on nexium 20mg.    The patient reports that he is not smoking cigarettes. He  "drinks alcohol on occasion. He is smoking pot sometimes.    He is UTD on his pneumonia vaccinations. declines shingles today. would like to update tetanus. He reports he had a colonoscopy done 5 years ago in [place] with Dr. Sharath Tolbert. we will request record again.    He is not allergic to any medications.        Advance Care Planning  Advanced care planning discussed and paperwork given.    I attest that I have had a face to face discussion with patient and or surrogate decision maker.   Included surrogate decision maker: NO  Advanced directive in chart: NO  LAPOST: NO    Total time spent: 16 minutes          Review of Systems   Constitutional: Negative.    HENT: Negative.    Eyes: Negative.    Respiratory: Negative.    Cardiovascular: Negative.    Gastrointestinal: Negative.    Endocrine: Negative.    Genitourinary: Positive for frequency. Negative for decreased urine volume, difficulty urinating, dysuria, enuresis, flank pain, hematuria and urgency.   Musculoskeletal: Negative.    Skin: Negative.    Allergic/Immunologic: Negative.    Neurological: Negative.    Hematological: Negative.    Psychiatric/Behavioral: Negative.    All other systems reviewed and are negative.        Review of patient's allergies indicates:  No Known Allergies   Vitals:    07/08/22 0943 07/08/22 0949   BP: (!) 140/84 138/86   BP Location: Left arm Left arm   Patient Position: Sitting Sitting   BP Method: Small (Manual) Small (Manual)   Pulse: 72    Resp: 16    Temp: 97.4 °F (36.3 °C)    TempSrc: Temporal    SpO2: 97%    Weight: 73.1 kg (161 lb 1.6 oz)    Height: 5' 5" (1.651 m)       Social History     Socioeconomic History    Marital status:    Occupational History    Occupation: retired   Tobacco Use    Smoking status: Current Every Day Smoker    Smokeless tobacco: Never Used   Substance and Sexual Activity    Alcohol use: Yes     Comment: 4-5 a day    Drug use: Yes     Types: Marijuana      Family History   Problem " Relation Age of Onset    Diabetes Mother     Heart attack Mother     Alcohol abuse Father     Alcohol abuse Sister     Alcohol abuse Brother     Heart attack Brother           Objective:     Physical Exam  Vitals and nursing note reviewed.   Constitutional:       Appearance: Normal appearance. He is normal weight.   HENT:      Head: Normocephalic.      Nose: Nose normal.      Mouth/Throat:      Mouth: Mucous membranes are moist.      Pharynx: Oropharynx is clear.   Eyes:      Extraocular Movements: Extraocular movements intact.   Cardiovascular:      Rate and Rhythm: Normal rate and regular rhythm.   Pulmonary:      Effort: Pulmonary effort is normal.      Breath sounds: Normal breath sounds. No stridor. No wheezing, rhonchi or rales.   Chest:      Chest wall: No tenderness.   Musculoskeletal:         General: Normal range of motion.      Comments: Ambulates with cane   Skin:     General: Skin is warm and dry.   Neurological:      General: No focal deficit present.      Mental Status: He is alert and oriented to person, place, and time. Mental status is at baseline.   Psychiatric:         Mood and Affect: Mood normal.       Current Outpatient Medications on File Prior to Visit   Medication Sig Dispense Refill    aspirin (ECOTRIN) 81 MG EC tablet Take 81 mg by mouth once daily.      atorvastatin (LIPITOR) 10 MG tablet Take 10 mg by mouth once daily at 6am.      calcium citrate-vitamin D3 250 mg-5 mcg (200 unit) Tab Take 1 tablet by mouth once daily.      esomeprazole (NEXIUM) 20 MG capsule Take 20 mg by mouth once daily.      losartan (COZAAR) 50 MG tablet Take 50 mg by mouth once daily.      metoprolol succinate (TOPROL-XL) 25 MG 24 hr tablet Take 25 mg by mouth once daily. for 90 days      niacin (NIASPAN) 1000 MG CR tablet Take 1,000 mg by mouth nightly.      omega-3 fatty acids/fish oil (FISH OIL-OMEGA-3 FATTY ACIDS) 300-1,000 mg capsule Take 4 g by mouth once daily at 6am.      paroxetine (PAXIL)  20 MG tablet Take 20 mg by mouth once daily.      gemfibroziL (LOPID) 600 MG tablet Take 600 mg by mouth 2 (two) times a day.      [DISCONTINUED] ibuprofen (ADVIL,MOTRIN) 800 MG tablet Take 1 tablet (800 mg total) by mouth every 8 (eight) hours as needed for Pain. Take with food (Patient not taking: Reported on 7/8/2022) 30 tablet 0    [DISCONTINUED] LIDOcaine 4 % PtMd Apply 1 patch topically.       No current facility-administered medications on file prior to visit.     Health Maintenance   Topic Date Due    Hepatitis C Screening  Never done    Abdominal Aortic Aneurysm Screening  Never done    High Dose Statin  07/08/2023    Lipid Panel  06/30/2027    TETANUS VACCINE  07/08/2031      Results for orders placed or performed in visit on 06/30/22   Comprehensive Metabolic Panel   Result Value Ref Range    Sodium Level 139 136 - 145 mmol/L    Potassium Level 3.8 3.5 - 5.1 mmol/L    Chloride 100 98 - 107 mmol/L    Carbon Dioxide 28 23 - 31 mmol/L    Glucose Level 109 82 - 115 mg/dL    Blood Urea Nitrogen 11.5 8.4 - 25.7 mg/dL    Creatinine 0.92 0.73 - 1.18 mg/dL    Calcium Level Total 9.4 8.8 - 10.0 mg/dL    Protein Total 7.9 (H) 5.8 - 7.6 gm/dL    Albumin Level 4.0 3.4 - 4.8 gm/dL    Globulin 3.9 (H) 2.4 - 3.5 gm/dL    Albumin/Globulin Ratio 1.0 (L) 1.1 - 2.0 ratio    Bilirubin Total 0.7 <=1.5 mg/dL    Alkaline Phosphatase 82 40 - 150 unit/L    Alanine Aminotransferase 16 0 - 55 unit/L    Aspartate Aminotransferase 24 5 - 34 unit/L    Estimated GFR-Non  >60 mls/min/1.73/m2   Lipid Panel   Result Value Ref Range    Cholesterol Total 201 (H) <=200 mg/dL    HDL Cholesterol 56 35 - 60 mg/dL    Triglyceride 130 34 - 140 mg/dL    Cholesterol/HDL Ratio 4 0 - 5    Very Low Density Lipoprotein 26     LDL Cholesterol 119.00 50.00 - 140.00 mg/dL   TSH   Result Value Ref Range    Thyroid Stimulating Hormone 2.0474 0.3500 - 4.9400 uIU/mL   CBC with Differential   Result Value Ref Range    WBC 5.8 4.5 - 11.5  x10(3)/mcL    RBC 4.43 (L) 4.70 - 6.10 x10(6)/mcL    Hgb 14.0 14.0 - 18.0 gm/dL    Hct 41.5 (L) 42.0 - 52.0 %    MCV 93.7 80.0 - 94.0 fL    MCH 31.6 (H) 27.0 - 31.0 pg    MCHC 33.7 33.0 - 36.0 mg/dL    RDW 13.3 11.5 - 17.0 %    Platelet 186 130 - 400 x10(3)/mcL    MPV 9.8 7.4 - 10.4 fL    IG# 0.01 0 - 0.0155 x10(3)/mcL    IG% 0.2 0 - 0.43 %    NRBC% 0.0 %   Manual Differential   Result Value Ref Range    Neut Man 44 (L) 47 - 80 %    Lymph Man 32 13 - 40 %    Monocyte Man 10 2 - 11 %    Eos Man 12 (H) 0 - 8 %    Abn Lymph Man 2 %    Abs Neut calc 2.552 2.1 - 9.2 x10(3)/mcL    Abs Mono 0.58 0.1 - 1.3 x10(3)/mcL    Abs Lymp 1.856 0.6 - 4.6 x10(3)/mcL    Abs Eos  0.696 0 - 0.9 x10(3)/mcL    RBC Morph Normal Normal    Platelet Est Adequate Normal, Adequate          Assessment & Plan:     Active Problem List with Overview Notes    Diagnosis Date Noted    Medicare annual wellness visit, subsequent 07/08/2022    Acid reflux 07/08/2022    Family history of diabetes mellitus 07/08/2022    Depressive disorder 07/08/2022    Hypertension 07/08/2022    Low vitamin D level 07/08/2022    Obesity 07/08/2022    Advanced care planning/counseling discussion 07/08/2022    History of myocardial infarction 07/08/2022    Stenosis of carotid artery 07/08/2022    Prostate cancer screening 07/08/2022    Urine frequency 07/08/2022    Hospital discharge follow-up 06/09/2022    Multiple closed fractures of ribs of left side 06/09/2022    CAD (coronary artery disease) 01/28/2015    Hyperlipidemia 01/28/2015       1. Medicare annual wellness visit, subsequent  Assessment & Plan:  Previously done labs reviewed with patient at time of appointment today  psa ordered  Will request colonoscopy report again    Advanced care planning discussed and paperwork given    Orders:  -     Hepatitis C Antibody    2. Advanced care planning/counseling discussion  Assessment & Plan:  Advanced care planning discussed and paperwork given.    I attest  that I have had a face to face discussion with patient and or surrogate decision maker.   Included surrogate decision maker: NO  Advanced directive in chart: NO  LAPOST: NO    Total time spent: 16 minutes        3. Low vitamin D level  Assessment & Plan:  Continue to supplement otc      4. Gastroesophageal reflux disease, unspecified whether esophagitis present  Assessment & Plan:  Stable on ppi      5. Obesity, unspecified classification, unspecified obesity type, unspecified whether serious comorbidity present  Assessment & Plan:  Encourage lifestyle change      6. Coronary artery disease, unspecified vessel or lesion type, unspecified whether angina present, unspecified whether native or transplanted heart  Assessment & Plan:  Stable on current meds. On asa. Keep appointments with cards      7. Hyperlipidemia, unspecified hyperlipidemia type  Assessment & Plan:  Stable on current meds.  Keep appointments with cards      8. Hypertension, unspecified type  Assessment & Plan:  Stable on current meds. On asa. Keep appointments with cards      9. Depressive disorder  Assessment & Plan:  Stable on paxil      10. Prostate cancer screening  Assessment & Plan:  psa today    Orders:  -     PSA, Screening    11. Urine frequency  Assessment & Plan:  Will get psa today. ua 6/30/22 negative.  If psa wnl, will try flomax.  Discussed decreasing fluid intake by 8pm.  Avoid coffee late in afternoon      12. Closed fracture of multiple ribs of left side, sequela  Assessment & Plan:  Doing well. Not on pain meds. No recent falls.         Follow up in about 1 year (around 7/8/2023) for wellness with labs.

## 2022-07-08 NOTE — ASSESSMENT & PLAN NOTE
Previously done labs reviewed with patient at time of appointment today  psa ordered  Will request colonoscopy report again    Advanced care planning discussed and paperwork given

## 2022-07-08 NOTE — PROGRESS NOTES
"TIME UP & GO (TUG)  Test begins with patient sitting back in standard arm chair.   When "Go" is said, the patient stands up and walks 10 feet at a normal pace before turning, walking back and sitting down.    Observe the patients postural stability, gait, stride length, and sway.  Check all that apply:  ? [] Slow tentative pace  ? [] Loss of balance  ? [] Short strides  ? [] Little or no arm swing  ? [] Steadying self on walls  ? [] Shuffling  ? [] En bloc turning  ? [] Not using assistive device properly    Time in seconds:  7 Seconds  (Older adults who takes = or > 12 seconds to complete TUG is at risk for falling.      WHISPER TEST  Test begins with patient standing arms length away (2 feet), facing away from examiner.  Patient covers the ear that is NOT being tested with one finger over the tragus.  Whisper a number-letter-number combination.  If a patient gets 3 total letters and/or numbers correct after a second attempt, it is considered a pass.    Right Ear: passed    [] 8-M-3   [] K-5-R   [] 2-K-7   [] S-4-G  Left Ear: passed       [] 8-M-3   [] K-5-R   [] 2-K-7   [] S-4-G      VISION SCREENING   unable to measure      MINI-COGNITIVE  Three Word Registration   []Version 1 []Version 2 []Version 3 []Version 4 []Version 5 []Version 6   [facility] Captain Daughter   Kief Season Kitchen Nation Garden Heaven   Chair Table Baby Finger Picture Moutain     Word Recall 3 points  Clock Drawing 1      HOME SAFETY QUESTIONNAIRE  Are emergency numbers kept by the phone and regularly updated? Yes  Are all household members aware of the dangers of smoking, especially in bed? Yes  Are working smoke alarm(s) and fire extinguisher(s) available for use? Yes  Do all household members know how to use them? Yes  Are firearms stored unloaded and securely locked? N/A  Have throw rugs been removed or fastened down? Yes  Are non-slip mats in all bathtubs and showers?  Yes  Do all stairways have a railing or " banister?  Yes  Are sidewalks and all outdoor steps clear of tools, toys and other articles?  Yes  Are doorways, halls, and stairs free of clutter?  Yes  Are all electrical cords in working order, easily seen, and not run under rug/carpets or wrapped around nails? Yes

## 2022-09-12 PROBLEM — Z09 HOSPITAL DISCHARGE FOLLOW-UP: Status: RESOLVED | Noted: 2022-06-09 | Resolved: 2022-09-12

## 2022-09-21 ENCOUNTER — HISTORICAL (OUTPATIENT)
Dept: ADMINISTRATIVE | Facility: HOSPITAL | Age: 72
End: 2022-09-21
Payer: MEDICARE

## 2022-10-03 RX ORDER — METOPROLOL SUCCINATE 25 MG/1
25 TABLET, EXTENDED RELEASE ORAL DAILY
Qty: 90 TABLET | Refills: 3 | Status: SHIPPED | OUTPATIENT
Start: 2022-10-03 | End: 2023-11-21

## 2022-10-10 PROBLEM — Z00.00 MEDICARE ANNUAL WELLNESS VISIT, SUBSEQUENT: Status: RESOLVED | Noted: 2022-07-08 | Resolved: 2022-10-10

## 2022-10-25 RX ORDER — NIACIN 1000 MG/1
1000 TABLET, EXTENDED RELEASE ORAL NIGHTLY
Qty: 90 TABLET | Refills: 1 | Status: SHIPPED | OUTPATIENT
Start: 2022-10-25 | End: 2023-05-01

## 2022-10-25 RX ORDER — GEMFIBROZIL 600 MG/1
600 TABLET, FILM COATED ORAL 2 TIMES DAILY
Qty: 90 TABLET | Refills: 1 | Status: SHIPPED | OUTPATIENT
Start: 2022-10-25 | End: 2023-01-23

## 2022-10-25 NOTE — TELEPHONE ENCOUNTER
----- Message from Lucinda Frost sent at 10/25/2022 10:03 AM CDT -----  Regarding: refill  .Type:  RX Refill Request    Who Called: edward  Refill or New Rx:refill  RX Name and Strength:gemfibroziL (LOPID) 600 MG  How is the patient currently taking it? (ex. 1XDay):2xday  Is this a 30 day or 90 day RX:90 day  Refill or New Rx:refill  RX Name and Strength:niacin (NIASPAN) 1000 MG   How is the patient currently taking it? (ex. 1XDay):1xday  Is this a 30 day or 90 day RX:90 day  Preferred Pharmacy with phone number:[email or web address]  Local or Mail Order:local  Ordering Provider:  Would the patient rather a call back or a response via [facility]?   Best Call Back Number: [phone]  Additional Information: Pt said he was been out of medicine over a week and he said [facility] been trying to get in contact with clinic but no one is responding. He is completely out of medicine. Pls have nurse to give pt a call back when its done .

## 2022-11-11 DIAGNOSIS — I65.22 STENOSIS OF LEFT CAROTID ARTERY: Primary | ICD-10-CM

## 2022-11-17 ENCOUNTER — OFFICE VISIT (OUTPATIENT)
Dept: CARDIAC SURGERY | Facility: CLINIC | Age: 72
End: 2022-11-17
Payer: MEDICARE

## 2022-11-17 VITALS
WEIGHT: 160 LBS | HEIGHT: 67 IN | SYSTOLIC BLOOD PRESSURE: 165 MMHG | BODY MASS INDEX: 25.11 KG/M2 | DIASTOLIC BLOOD PRESSURE: 105 MMHG | HEART RATE: 65 BPM

## 2022-11-17 DIAGNOSIS — I65.22 STENOSIS OF LEFT CAROTID ARTERY: Primary | ICD-10-CM

## 2022-11-17 PROCEDURE — 1159F MED LIST DOCD IN RCRD: CPT | Mod: CPTII,,, | Performed by: THORACIC SURGERY (CARDIOTHORACIC VASCULAR SURGERY)

## 2022-11-17 PROCEDURE — 99212 OFFICE O/P EST SF 10 MIN: CPT | Mod: ,,, | Performed by: THORACIC SURGERY (CARDIOTHORACIC VASCULAR SURGERY)

## 2022-11-17 PROCEDURE — 3008F PR BODY MASS INDEX (BMI) DOCUMENTED: ICD-10-PCS | Mod: CPTII,,, | Performed by: THORACIC SURGERY (CARDIOTHORACIC VASCULAR SURGERY)

## 2022-11-17 PROCEDURE — 1159F PR MEDICATION LIST DOCUMENTED IN MEDICAL RECORD: ICD-10-PCS | Mod: CPTII,,, | Performed by: THORACIC SURGERY (CARDIOTHORACIC VASCULAR SURGERY)

## 2022-11-17 PROCEDURE — 99212 PR OFFICE/OUTPT VISIT, EST, LEVL II, 10-19 MIN: ICD-10-PCS | Mod: ,,, | Performed by: THORACIC SURGERY (CARDIOTHORACIC VASCULAR SURGERY)

## 2022-11-17 PROCEDURE — 1101F PT FALLS ASSESS-DOCD LE1/YR: CPT | Mod: CPTII,,, | Performed by: THORACIC SURGERY (CARDIOTHORACIC VASCULAR SURGERY)

## 2022-11-17 PROCEDURE — 4010F ACE/ARB THERAPY RXD/TAKEN: CPT | Mod: CPTII,,, | Performed by: THORACIC SURGERY (CARDIOTHORACIC VASCULAR SURGERY)

## 2022-11-17 PROCEDURE — 4010F PR ACE/ARB THEARPY RXD/TAKEN: ICD-10-PCS | Mod: CPTII,,, | Performed by: THORACIC SURGERY (CARDIOTHORACIC VASCULAR SURGERY)

## 2022-11-17 PROCEDURE — 3077F PR MOST RECENT SYSTOLIC BLOOD PRESSURE >= 140 MM HG: ICD-10-PCS | Mod: CPTII,,, | Performed by: THORACIC SURGERY (CARDIOTHORACIC VASCULAR SURGERY)

## 2022-11-17 PROCEDURE — 3008F BODY MASS INDEX DOCD: CPT | Mod: CPTII,,, | Performed by: THORACIC SURGERY (CARDIOTHORACIC VASCULAR SURGERY)

## 2022-11-17 PROCEDURE — 3080F DIAST BP >= 90 MM HG: CPT | Mod: CPTII,,, | Performed by: THORACIC SURGERY (CARDIOTHORACIC VASCULAR SURGERY)

## 2022-11-17 PROCEDURE — 1101F PR PT FALLS ASSESS DOC 0-1 FALLS W/OUT INJ PAST YR: ICD-10-PCS | Mod: CPTII,,, | Performed by: THORACIC SURGERY (CARDIOTHORACIC VASCULAR SURGERY)

## 2022-11-17 PROCEDURE — 3288F FALL RISK ASSESSMENT DOCD: CPT | Mod: CPTII,,, | Performed by: THORACIC SURGERY (CARDIOTHORACIC VASCULAR SURGERY)

## 2022-11-17 PROCEDURE — 3080F PR MOST RECENT DIASTOLIC BLOOD PRESSURE >= 90 MM HG: ICD-10-PCS | Mod: CPTII,,, | Performed by: THORACIC SURGERY (CARDIOTHORACIC VASCULAR SURGERY)

## 2022-11-17 PROCEDURE — 3288F PR FALLS RISK ASSESSMENT DOCUMENTED: ICD-10-PCS | Mod: CPTII,,, | Performed by: THORACIC SURGERY (CARDIOTHORACIC VASCULAR SURGERY)

## 2022-11-17 PROCEDURE — 3077F SYST BP >= 140 MM HG: CPT | Mod: CPTII,,, | Performed by: THORACIC SURGERY (CARDIOTHORACIC VASCULAR SURGERY)

## 2022-11-17 NOTE — PROGRESS NOTES
"Subjective:      Patient ID: Joao Ignacio Jr is a 72 y.o. male.    Chief Complaint: Follow-up (1yr Carotid us )      HPI:  After an episode of orthostasis the patient suffered a fall with subsequent left rib fractures approximately 2 months ago he has been well ever since.  He has no chest pain or shortness of breath.  Today's ultrasound shows the left carotid is widely patent with normal flow and velocity.      Current Outpatient Medications:     aspirin (ECOTRIN) 81 MG EC tablet, Take 81 mg by mouth once daily., Disp: , Rfl:     atorvastatin (LIPITOR) 10 MG tablet, Take 10 mg by mouth once daily at 6am., Disp: , Rfl:     calcium citrate-vitamin D3 250 mg-5 mcg (200 unit) Tab, Take 1 tablet by mouth once daily., Disp: , Rfl:     esomeprazole (NEXIUM) 20 MG capsule, Take 20 mg by mouth once daily., Disp: , Rfl:     gemfibroziL (LOPID) 600 MG tablet, Take 1 tablet (600 mg total) by mouth 2 (two) times a day., Disp: 90 tablet, Rfl: 1    losartan (COZAAR) 50 MG tablet, Take 50 mg by mouth once daily., Disp: , Rfl:     metoprolol succinate (TOPROL-XL) 25 MG 24 hr tablet, Take 1 tablet (25 mg total) by mouth once daily. for 90 days, Disp: 90 tablet, Rfl: 3    niacin (NIASPAN) 1000 MG CR tablet, Take 1 tablet (1,000 mg total) by mouth nightly., Disp: 90 tablet, Rfl: 1    omega-3 fatty acids/fish oil (FISH OIL-OMEGA-3 FATTY ACIDS) 300-1,000 mg capsule, Take 4 g by mouth once daily at 6am., Disp: , Rfl:     paroxetine (PAXIL) 20 MG tablet, Take 20 mg by mouth once daily., Disp: , Rfl:   Review of patient's allergies indicates:  No Known Allergies    BP (!) 165/105   Pulse 65   Ht 5' 7" (1.702 m)   Wt 72.6 kg (160 lb)   BMI 25.06 kg/m²     Review of Systems   Constitutional: Negative.   HENT: Negative.    Eyes: Negative.    Cardiovascular: Negative.    Respiratory: Negative.    Endocrine: Negative.    Hematologic/Lymphatic: Negative.    Skin: Negative.    Musculoskeletal: Negative.    Gastrointestinal: Negative.  "   Genitourinary: Negative.    Neurological: Negative.    Psychiatric/Behavioral: Negative.    Allergic/Immunologic: Negative.        Objective:     Constitutional:  No acute distress  HENT:  Supple without mass.  Trachea midline.  No carotid bruits  Eyes:  PERRLA  Cardiovascular:  Regular rate and rhythm without murmur rub or gallop  Respiratory:  Clear to auscultation  Endocrine:  Hematologic/Lymphatic:   Skin:  Warm and dry  Musculoskeletal:  No gross deformity  Gastrointestinal:   Genitourinary:   Neurological:  Normal  Psychiatric/Behavioral:   Extremities:  No cyanosis clubbing or edema    Assessment:  Stable status     Imaging:  Carotid ultrasound reviewed      Plan:  Return in 1 year with left carotid ultrasound

## 2022-12-09 RX ORDER — LOSARTAN POTASSIUM 50 MG/1
50 TABLET ORAL DAILY
Qty: 90 TABLET | Refills: 3 | Status: SHIPPED | OUTPATIENT
Start: 2022-12-09 | End: 2023-11-28

## 2022-12-09 NOTE — TELEPHONE ENCOUNTER
----- Message from Gisela Hanley sent at 12/9/2022 10:20 AM CST -----  Regarding: med refill  .Type:  RX Refill Request    Who Called: pt   Refill or New Rx:refill   RX Name and Strength:losartan (COZAAR) 50 MG tablet  How is the patient currently taking it? (ex. 1XDay): 1xday  Is this a 30 day or 90 day RX:  Preferred Pharmacy with phone number[id number] [place] [address]  Local or Mail Order:local   Ordering Provider:Yvonne   Would the patient rather a call back or a response via [facility]? Call back   Best Call Back Number:[phone]  Additional Information:

## 2023-06-15 ENCOUNTER — OFFICE VISIT (OUTPATIENT)
Dept: URGENT CARE | Facility: CLINIC | Age: 73
End: 2023-06-15
Payer: MEDICARE

## 2023-06-15 VITALS
HEART RATE: 73 BPM | BODY MASS INDEX: 25.83 KG/M2 | WEIGHT: 155 LBS | HEIGHT: 65 IN | TEMPERATURE: 98 F | SYSTOLIC BLOOD PRESSURE: 169 MMHG | OXYGEN SATURATION: 97 % | RESPIRATION RATE: 18 BRPM | DIASTOLIC BLOOD PRESSURE: 89 MMHG

## 2023-06-15 DIAGNOSIS — E55.9 VITAMIN D DEFICIENCY, UNSPECIFIED: ICD-10-CM

## 2023-06-15 DIAGNOSIS — E78.5 HYPERLIPIDEMIA, UNSPECIFIED HYPERLIPIDEMIA TYPE: ICD-10-CM

## 2023-06-15 DIAGNOSIS — R79.89 LOW VITAMIN D LEVEL: ICD-10-CM

## 2023-06-15 DIAGNOSIS — H69.92 EUSTACHIAN TUBE DYSFUNCTION, LEFT: Primary | ICD-10-CM

## 2023-06-15 DIAGNOSIS — Z00.00 MEDICARE ANNUAL WELLNESS VISIT, SUBSEQUENT: Primary | ICD-10-CM

## 2023-06-15 PROCEDURE — 99203 PR OFFICE/OUTPT VISIT, NEW, LEVL III, 30-44 MIN: ICD-10-PCS | Mod: ,,, | Performed by: FAMILY MEDICINE

## 2023-06-15 PROCEDURE — 99203 OFFICE O/P NEW LOW 30 MIN: CPT | Mod: ,,, | Performed by: FAMILY MEDICINE

## 2023-06-15 RX ORDER — FLUTICASONE PROPIONATE 50 MCG
1 SPRAY, SUSPENSION (ML) NASAL DAILY
Qty: 9.9 ML | Refills: 0 | Status: SHIPPED | OUTPATIENT
Start: 2023-06-15

## 2023-06-15 RX ORDER — PAROXETINE HYDROCHLORIDE 20 MG/1
20 TABLET, FILM COATED ORAL DAILY
Qty: 90 TABLET | Refills: 3 | Status: SHIPPED | OUTPATIENT
Start: 2023-06-15

## 2023-06-15 RX ORDER — PREDNISONE 10 MG/1
30 TABLET ORAL DAILY
Qty: 15 TABLET | Refills: 0 | Status: SHIPPED | OUTPATIENT
Start: 2023-06-15 | End: 2023-06-20

## 2023-06-15 NOTE — TELEPHONE ENCOUNTER
----- Message from Lucinda Frost sent at 6/15/2023  1:10 PM CDT -----  Regarding: refill  ..Type:  RX Refill Request    Who Called: edward  Refill or New Rx:refill  RX Name and Strength:paroxetine (PAXIL) 20 MG   How is the patient currently taking it? (ex. 1XDay):1xday  Is this a 30 day or 90 day RX:90  Preferred Pharmacy with phone number:[facility]  Local or Mail Order:  Ordering Provider:  Would the patient rather a call back or a response via [facility]?   Best Call Back Number:[phone]   Additional Information:

## 2023-06-15 NOTE — TELEPHONE ENCOUNTER
----- Message from Lucinda Frost sent at 6/15/2023  1:11 PM CDT -----  Regarding: orders  ...Caller is requesting to schedule their Lab appointment prior to annual appointment.  Order is not listed in EPIC.  Please enter order and contact patient to schedule.    Name of Caller:rafael Conrad Date and Time of Labs:06/18    Date of [facility] Appointment:07/12    [facility]    Would the patient rather a call back or a response via [facility]?     Best Call Back Number:[phone]     Additional Information:pt needs orders in for wellness

## 2023-06-15 NOTE — PATIENT INSTRUCTIONS
Plan:   Medications sent to pharmacy  He will start taking Claritin or Zyrtec along with the steroids and nasal spray.  Keep your appointment with Dr. Russell if symptoms are not improving would recommend you see an ear nose throat doctor.

## 2023-06-15 NOTE — PROGRESS NOTES
"Subjective:      Patient ID: Joao Ignacio Jr is a 73 y.o. male.    Vitals:  height is 5' 5" (1.651 m) and weight is 70.3 kg (155 lb). His temperature is 97.6 °F (36.4 °C). His blood pressure is 169/89 (abnormal) and his pulse is 73. His respiration is 18 and oxygen saturation is 97%.     Chief Complaint: Ear Drainage ( Patient is a 73 y.o. male who presents to urgent care with complaints of ear drainage and weird noises in his head said when he moves around it sounds like a bug x1 weeks. Alleviating factors include wax remover and rinse with no relief. Patient said the noise is started to bug him and gets him nauseated. )    73-year-old male presents to clinic complaining of fluid in the left ear.  States he has some weird no wheezes as well coming from the left ear only when he is moving his head but not when at rest.  States that is causing some nausea.  States he once had a bug in his ear.  Denies any other symptoms.  Denies any fever.  Denies any Q-tip use but states he tried Debrox.      Constitution: Negative.   HENT:  Positive for tinnitus.    Neck: neck negative.   Cardiovascular: Negative.    Eyes: Negative.    Respiratory: Negative.     Gastrointestinal: Negative.    Genitourinary: Negative.    Musculoskeletal: Negative.    Skin: Negative.    Allergic/Immunologic: Negative.    Neurological: Negative.    Hematologic/Lymphatic: Negative.     Objective:     Physical Exam   Constitutional: He is oriented to person, place, and time.  Non-toxic appearance. He does not appear ill. No distress.   HENT:   Head: Normocephalic and atraumatic.   Ears:   Right Ear: impacted cerumen (no wax in the ear canals.  There is an effusion of the left TM.  No erythema of the TM)  Left Ear: impacted cerumen  Eyes: Conjunctivae are normal.   Pulmonary/Chest: Effort normal.   Abdominal: Normal appearance.   Neurological: He is alert and oriented to person, place, and time.   Skin: Skin is not diaphoretic.   Psychiatric: His " behavior is normal. Mood, judgment and thought content normal.   Vitals reviewed.       Previous History      Review of patient's allergies indicates:  No Known Allergies    Past Medical History:   Diagnosis Date    Acid reflux     CAD (coronary artery disease)     Carotid stenosis     Depression     History of CVA (cerebrovascular accident)     History of MI (myocardial infarction)     Hyperlipidemia     Hypertension     Obesity, unspecified     Vitamin D deficiency      Current Outpatient Medications   Medication Instructions    aspirin (ECOTRIN) 81 mg, Oral, Daily    atorvastatin (LIPITOR) 10 mg, Oral, Daily    Ca comb no.1/vit D3/B6/FA/B12 (HEARTBURN & ACID REFLUX ORAL) Oral, Unknown of medication name    calcium citrate-vitamin D3 250 mg-5 mcg (200 unit) Tab 1 tablet, Oral, Daily    esomeprazole (NEXIUM) 20 mg, Oral, Daily    fluticasone propionate (FLONASE) 50 mcg, Each Nostril, Daily    gemfibroziL (LOPID) 600 MG tablet Take 1 tablet by mouth twice daily    losartan (COZAAR) 50 mg, Oral, Daily    metoprolol succinate (TOPROL-XL) 25 mg, Oral, Daily, for 90 days    niacin (NIASPAN) 1000 MG CR tablet Take 1 tablet by mouth nightly    omega-3 fatty acids/fish oil (FISH OIL-OMEGA-3 FATTY ACIDS) 300-1,000 mg capsule 4 g, Oral, Daily    paroxetine (PAXIL) 20 mg, Oral, Daily    predniSONE (DELTASONE) 30 mg, Oral, Daily     Past Surgical History:   Procedure Laterality Date    ANGIOGRAM, CORONARY, WITH LEFT HEART CATHETERIZATION      CAROTID ARTERY ENDARTERECTOMY  10/14/2016    COLONOSCOPY      CORONARY ARTERY BYPASS GRAFT      HERNIA REPAIR      RT ELBOW SURGERY      TRIPLE BYPASS HEART SURGERY       Family History   Problem Relation Age of Onset    Diabetes Mother     Heart attack Mother     Alcohol abuse Father     Alcohol abuse Sister     Alcohol abuse Brother     Heart attack Brother        Social History     Tobacco Use    Smoking status: Every Day    Smokeless tobacco: Never   Substance Use Topics    Alcohol  "use: Yes     Comment: 4-5 a day    Drug use: Yes     Types: Marijuana        Physical Exam      Vital Signs Reviewed   BP (!) 169/89 (BP Location: Right arm)   Pulse 73   Temp 97.6 °F (36.4 °C)   Resp 18   Ht 5' 5" (1.651 m)   Wt 70.3 kg (155 lb)   SpO2 97%   BMI 25.79 kg/m²        Procedures    Procedures     Labs     Results for orders placed or performed in visit on 11/17/22   CV Ultrasound carotid left   Result Value Ref Range    Left ICA/CCA ratio 0.92     Left Highest ICA 65.00     Left Highest CCA 71     LT Highest EDV 18.00     Left CCA dist sys 71 cm/s    Left CCA dist seaman 14 cm/s    Left ICA prox sys 65 cm/s    Left ICA prox seamna 14 cm/s    Left ICA mid sys 65 cm/s    Left ICA mid seaman 18 cm/s    Left CCA mid sys 56 cm/s    Left CCA mid seaman 11 cm/s       Assessment:     1. Eustachian tube dysfunction, left        Plan:   Medications sent to pharmacy  He will start taking Claritin or Zyrtec along with the steroids and nasal spray.  Keep your appointment with Dr. Russell if symptoms are not improving would recommend you see an ear nose throat doctor.    Eustachian tube dysfunction, left    Other orders  -     fluticasone propionate (FLONASE) 50 mcg/actuation nasal spray; 1 spray (50 mcg total) by Each Nostril route once daily.  Dispense: 9.9 mL; Refill: 0  -     predniSONE (DELTASONE) 10 MG tablet; Take 3 tablets (30 mg total) by mouth once daily. for 5 days  Dispense: 15 tablet; Refill: 0                    "

## 2023-06-28 RX ORDER — GEMFIBROZIL 600 MG/1
TABLET, FILM COATED ORAL
Qty: 90 TABLET | Refills: 0 | Status: SHIPPED | OUTPATIENT
Start: 2023-06-28 | End: 2023-08-16

## 2023-07-08 ENCOUNTER — LAB VISIT (OUTPATIENT)
Dept: LAB | Facility: HOSPITAL | Age: 73
End: 2023-07-08
Attending: FAMILY MEDICINE
Payer: MEDICARE

## 2023-07-08 DIAGNOSIS — R79.89 LOW VITAMIN D LEVEL: ICD-10-CM

## 2023-07-08 DIAGNOSIS — E78.5 HYPERLIPIDEMIA, UNSPECIFIED HYPERLIPIDEMIA TYPE: ICD-10-CM

## 2023-07-08 DIAGNOSIS — Z00.00 MEDICARE ANNUAL WELLNESS VISIT, SUBSEQUENT: ICD-10-CM

## 2023-07-08 DIAGNOSIS — E55.9 VITAMIN D DEFICIENCY, UNSPECIFIED: ICD-10-CM

## 2023-07-08 LAB
ALBUMIN SERPL-MCNC: 4.1 G/DL (ref 3.4–4.8)
ALBUMIN/GLOB SERPL: 1.2 RATIO (ref 1.1–2)
ALP SERPL-CCNC: 67 UNIT/L (ref 40–150)
ALT SERPL-CCNC: 13 UNIT/L (ref 0–55)
APPEARANCE UR: CLEAR
AST SERPL-CCNC: 25 UNIT/L (ref 5–34)
BACTERIA #/AREA URNS AUTO: ABNORMAL /HPF
BASOPHILS # BLD AUTO: 0.02 X10(3)/MCL
BASOPHILS NFR BLD AUTO: 0.4 %
BILIRUB UR QL STRIP.AUTO: NEGATIVE MG/DL
BILIRUBIN DIRECT+TOT PNL SERPL-MCNC: 0.5 MG/DL
BUN SERPL-MCNC: 23.5 MG/DL (ref 8.4–25.7)
CALCIUM SERPL-MCNC: 9.3 MG/DL (ref 8.8–10)
CHLORIDE SERPL-SCNC: 103 MMOL/L (ref 98–107)
CHOLEST SERPL-MCNC: 150 MG/DL
CHOLEST/HDLC SERPL: 3 {RATIO} (ref 0–5)
CO2 SERPL-SCNC: 24 MMOL/L (ref 23–31)
COLOR UR: ABNORMAL
CREAT SERPL-MCNC: 0.96 MG/DL (ref 0.73–1.18)
DEPRECATED CALCIDIOL+CALCIFEROL SERPL-MC: 31.9 NG/ML (ref 30–80)
EOSINOPHIL # BLD AUTO: 0.45 X10(3)/MCL (ref 0–0.9)
EOSINOPHIL NFR BLD AUTO: 9.9 %
ERYTHROCYTE [DISTWIDTH] IN BLOOD BY AUTOMATED COUNT: 12.3 % (ref 11.5–17)
GFR SERPLBLD CREATININE-BSD FMLA CKD-EPI: >60 MLS/MIN/1.73/M2
GLOBULIN SER-MCNC: 3.4 GM/DL (ref 2.4–3.5)
GLUCOSE SERPL-MCNC: 107 MG/DL (ref 82–115)
GLUCOSE UR QL STRIP.AUTO: NEGATIVE MG/DL
HCT VFR BLD AUTO: 43 % (ref 42–52)
HDLC SERPL-MCNC: 57 MG/DL (ref 35–60)
HGB BLD-MCNC: 14.6 G/DL (ref 14–18)
IMM GRANULOCYTES # BLD AUTO: 0.02 X10(3)/MCL (ref 0–0.04)
IMM GRANULOCYTES NFR BLD AUTO: 0.4 %
KETONES UR QL STRIP.AUTO: NEGATIVE MG/DL
LDLC SERPL CALC-MCNC: 79 MG/DL (ref 50–140)
LEUKOCYTE ESTERASE UR QL STRIP.AUTO: NEGATIVE UNIT/L
LYMPHOCYTES # BLD AUTO: 1.68 X10(3)/MCL (ref 0.6–4.6)
LYMPHOCYTES NFR BLD AUTO: 37.1 %
MCH RBC QN AUTO: 31.7 PG (ref 27–31)
MCHC RBC AUTO-ENTMCNC: 34 G/DL (ref 33–36)
MCV RBC AUTO: 93.3 FL (ref 80–94)
MONOCYTES # BLD AUTO: 0.44 X10(3)/MCL (ref 0.1–1.3)
MONOCYTES NFR BLD AUTO: 9.7 %
NEUTROPHILS # BLD AUTO: 1.92 X10(3)/MCL (ref 2.1–9.2)
NEUTROPHILS NFR BLD AUTO: 42.5 %
NITRITE UR QL STRIP.AUTO: NEGATIVE
NRBC BLD AUTO-RTO: 0 %
PH UR STRIP.AUTO: 6.5 [PH]
PLATELET # BLD AUTO: 195 X10(3)/MCL (ref 130–400)
PMV BLD AUTO: 10.3 FL (ref 7.4–10.4)
POTASSIUM SERPL-SCNC: 4.3 MMOL/L (ref 3.5–5.1)
PROT SERPL-MCNC: 7.5 GM/DL (ref 5.8–7.6)
PROT UR QL STRIP.AUTO: 30 MG/DL
RBC # BLD AUTO: 4.61 X10(6)/MCL (ref 4.7–6.1)
RBC #/AREA URNS AUTO: ABNORMAL /HPF
RBC UR QL AUTO: NEGATIVE UNIT/L
SODIUM SERPL-SCNC: 139 MMOL/L (ref 136–145)
SP GR UR STRIP.AUTO: 1.01
SPERM URNS QL MICRO: ABNORMAL /HPF
SQUAMOUS #/AREA URNS AUTO: ABNORMAL /HPF
TRIGL SERPL-MCNC: 69 MG/DL (ref 34–140)
TSH SERPL-ACNC: 1.8 UIU/ML (ref 0.35–4.94)
UROBILINOGEN UR STRIP-ACNC: 0.2 MG/DL
VLDLC SERPL CALC-MCNC: 14 MG/DL
WBC # SPEC AUTO: 4.53 X10(3)/MCL (ref 4.5–11.5)
WBC #/AREA URNS AUTO: ABNORMAL /HPF

## 2023-07-08 PROCEDURE — 80053 COMPREHEN METABOLIC PANEL: CPT

## 2023-07-08 PROCEDURE — 36415 COLL VENOUS BLD VENIPUNCTURE: CPT

## 2023-07-08 PROCEDURE — 81001 URINALYSIS AUTO W/SCOPE: CPT

## 2023-07-08 PROCEDURE — 85025 COMPLETE CBC W/AUTO DIFF WBC: CPT

## 2023-07-08 PROCEDURE — 84443 ASSAY THYROID STIM HORMONE: CPT

## 2023-07-08 PROCEDURE — 82306 VITAMIN D 25 HYDROXY: CPT

## 2023-07-08 PROCEDURE — 80061 LIPID PANEL: CPT

## 2023-07-12 ENCOUNTER — TELEPHONE (OUTPATIENT)
Dept: FAMILY MEDICINE | Facility: CLINIC | Age: 73
End: 2023-07-12

## 2023-07-12 ENCOUNTER — OFFICE VISIT (OUTPATIENT)
Dept: FAMILY MEDICINE | Facility: CLINIC | Age: 73
End: 2023-07-12
Payer: MEDICARE

## 2023-07-12 VITALS
OXYGEN SATURATION: 97 % | WEIGHT: 161.81 LBS | SYSTOLIC BLOOD PRESSURE: 136 MMHG | TEMPERATURE: 98 F | HEIGHT: 65 IN | BODY MASS INDEX: 26.96 KG/M2 | HEART RATE: 71 BPM | DIASTOLIC BLOOD PRESSURE: 88 MMHG | RESPIRATION RATE: 16 BRPM

## 2023-07-12 DIAGNOSIS — I25.10 CORONARY ARTERY DISEASE, UNSPECIFIED VESSEL OR LESION TYPE, UNSPECIFIED WHETHER ANGINA PRESENT, UNSPECIFIED WHETHER NATIVE OR TRANSPLANTED HEART: ICD-10-CM

## 2023-07-12 DIAGNOSIS — I10 PRIMARY HYPERTENSION: ICD-10-CM

## 2023-07-12 DIAGNOSIS — K21.9 GASTROESOPHAGEAL REFLUX DISEASE, UNSPECIFIED WHETHER ESOPHAGITIS PRESENT: ICD-10-CM

## 2023-07-12 DIAGNOSIS — F32.A DEPRESSIVE DISORDER: ICD-10-CM

## 2023-07-12 DIAGNOSIS — Z12.5 PROSTATE CANCER SCREENING: ICD-10-CM

## 2023-07-12 DIAGNOSIS — Z00.00 MEDICARE ANNUAL WELLNESS VISIT, SUBSEQUENT: Primary | ICD-10-CM

## 2023-07-12 DIAGNOSIS — I25.2 HISTORY OF MYOCARDIAL INFARCTION: ICD-10-CM

## 2023-07-12 DIAGNOSIS — R79.89 LOW VITAMIN D LEVEL: ICD-10-CM

## 2023-07-12 DIAGNOSIS — Z86.73 HISTORY OF CVA (CEREBROVASCULAR ACCIDENT): ICD-10-CM

## 2023-07-12 DIAGNOSIS — E78.2 MIXED HYPERLIPIDEMIA: ICD-10-CM

## 2023-07-12 DIAGNOSIS — Z71.89 ADVANCED CARE PLANNING/COUNSELING DISCUSSION: ICD-10-CM

## 2023-07-12 DIAGNOSIS — E55.9 VITAMIN D DEFICIENCY, UNSPECIFIED: ICD-10-CM

## 2023-07-12 PROBLEM — R35.0 URINE FREQUENCY: Status: RESOLVED | Noted: 2022-07-08 | Resolved: 2023-07-12

## 2023-07-12 PROBLEM — E66.9 OBESITY: Status: RESOLVED | Noted: 2022-07-08 | Resolved: 2023-07-12

## 2023-07-12 PROCEDURE — 3288F FALL RISK ASSESSMENT DOCD: CPT | Mod: CPTII,,, | Performed by: FAMILY MEDICINE

## 2023-07-12 PROCEDURE — 3077F PR MOST RECENT SYSTOLIC BLOOD PRESSURE >= 140 MM HG: ICD-10-PCS | Mod: CPTII,,, | Performed by: FAMILY MEDICINE

## 2023-07-12 PROCEDURE — 1159F MED LIST DOCD IN RCRD: CPT | Mod: CPTII,,, | Performed by: FAMILY MEDICINE

## 2023-07-12 PROCEDURE — 3008F BODY MASS INDEX DOCD: CPT | Mod: CPTII,,, | Performed by: FAMILY MEDICINE

## 2023-07-12 PROCEDURE — 4010F ACE/ARB THERAPY RXD/TAKEN: CPT | Mod: CPTII,,, | Performed by: FAMILY MEDICINE

## 2023-07-12 PROCEDURE — 3080F PR MOST RECENT DIASTOLIC BLOOD PRESSURE >= 90 MM HG: ICD-10-PCS | Mod: CPTII,,, | Performed by: FAMILY MEDICINE

## 2023-07-12 PROCEDURE — 4010F PR ACE/ARB THEARPY RXD/TAKEN: ICD-10-PCS | Mod: CPTII,,, | Performed by: FAMILY MEDICINE

## 2023-07-12 PROCEDURE — 1160F RVW MEDS BY RX/DR IN RCRD: CPT | Mod: CPTII,,, | Performed by: FAMILY MEDICINE

## 2023-07-12 PROCEDURE — G0439 PPPS, SUBSEQ VISIT: HCPCS | Mod: ,,, | Performed by: FAMILY MEDICINE

## 2023-07-12 PROCEDURE — 1126F AMNT PAIN NOTED NONE PRSNT: CPT | Mod: CPTII,,, | Performed by: FAMILY MEDICINE

## 2023-07-12 PROCEDURE — 1126F PR PAIN SEVERITY QUANTIFIED, NO PAIN PRESENT: ICD-10-PCS | Mod: CPTII,,, | Performed by: FAMILY MEDICINE

## 2023-07-12 PROCEDURE — 3008F PR BODY MASS INDEX (BMI) DOCUMENTED: ICD-10-PCS | Mod: CPTII,,, | Performed by: FAMILY MEDICINE

## 2023-07-12 PROCEDURE — 1159F PR MEDICATION LIST DOCUMENTED IN MEDICAL RECORD: ICD-10-PCS | Mod: CPTII,,, | Performed by: FAMILY MEDICINE

## 2023-07-12 PROCEDURE — 1101F PR PT FALLS ASSESS DOC 0-1 FALLS W/OUT INJ PAST YR: ICD-10-PCS | Mod: CPTII,,, | Performed by: FAMILY MEDICINE

## 2023-07-12 PROCEDURE — G0439 PR MEDICARE ANNUAL WELLNESS SUBSEQUENT VISIT: ICD-10-PCS | Mod: ,,, | Performed by: FAMILY MEDICINE

## 2023-07-12 PROCEDURE — 3288F PR FALLS RISK ASSESSMENT DOCUMENTED: ICD-10-PCS | Mod: CPTII,,, | Performed by: FAMILY MEDICINE

## 2023-07-12 PROCEDURE — 1160F PR REVIEW ALL MEDS BY PRESCRIBER/CLIN PHARMACIST DOCUMENTED: ICD-10-PCS | Mod: CPTII,,, | Performed by: FAMILY MEDICINE

## 2023-07-12 PROCEDURE — 3077F SYST BP >= 140 MM HG: CPT | Mod: CPTII,,, | Performed by: FAMILY MEDICINE

## 2023-07-12 PROCEDURE — 1101F PT FALLS ASSESS-DOCD LE1/YR: CPT | Mod: CPTII,,, | Performed by: FAMILY MEDICINE

## 2023-07-12 PROCEDURE — 3080F DIAST BP >= 90 MM HG: CPT | Mod: CPTII,,, | Performed by: FAMILY MEDICINE

## 2023-07-12 NOTE — PROGRESS NOTES
Patient ID: [id number]     Chief Complaint: Medicare AWV (Wellness )      HPI:     Joao Ignacio Jr is a 73 y.o. male here today for a Medicare Wellness. No other complaints today.     presents to the clinic unaccompanied for his wellness visit. he did labs already.  they were reviewed with the patient at the time of his appt today.       Patient has hypertension, CAD, carotid stenosis as well as history of MI. He reports he saw Dr. Vaughan for right carotid artery occlusion. Sees him annually in November.  Does US. He monitors his blood pressures at home and states they are normally 130s/70s. He takes his metoprolol BID and his losartan 25mg in the morning from dr. samayoa. He also has hyperlipidemia and is on atorvastatin, fish oil, niacin, and gemfibrozil. He is on a baby aspirin. his cardiologist Dr. Samayoa. he sees him annually. Has an appointment 6/2023.      He also has a history of mini strokes after his heart attack. He is no longer on Depakote or valproic acid. he stopped valproic acid > 1 year ago (weaned himself off). denies tremors. feeling well. He saw Dr. Clifford. does not have scheduled follow up.     He also has depression and is on Paxil. He is currently taking half of a 20 mg tablet (10mg) daily. He is happy with his current dosing. He states that the higher dose made him jittery.    Has hypovitaminosis D.  Supplements otc.  Level 7/2023 was low normal.      He has acid reflux and is on nexium 20mg.     The patient reports that he is not smoking cigarettes. He drinks alcohol on occasion. He is smoking pot sometimes.     He is UTD on his pneumonia vaccinations. declines shingles today. He reports he had a colonoscopy done 5 years ago in [place] with Dr. Sharath Tolbert. we will request record again.     He is not allergic to any medications.             Past Surgical History:   Procedure Laterality Date    ANGIOGRAM, CORONARY, WITH LEFT HEART CATHETERIZATION      CAROTID ARTERY ENDARTERECTOMY  10/14/2016     COLONOSCOPY      CORONARY ARTERY BYPASS GRAFT      HERNIA REPAIR      RT ELBOW SURGERY      TRIPLE BYPASS HEART SURGERY         Review of patient's allergies indicates:  No Known Allergies    Outpatient Medications Marked as Taking for the 7/12/23 encounter (Office Visit) with Vivienne Russell MD   Medication Sig Dispense Refill    aspirin (ECOTRIN) 81 MG EC tablet Take 81 mg by mouth once daily.      atorvastatin (LIPITOR) 10 MG tablet Take 10 mg by mouth once daily at 6am.      Ca comb no.1/vit D3/B6/FA/B12 (HEARTBURN & ACID REFLUX ORAL) Take by mouth. Unknown of medication name      calcium citrate-vitamin D3 250 mg-5 mcg (200 unit) Tab Take 1 tablet by mouth once daily.      esomeprazole (NEXIUM) 20 MG capsule Take 20 mg by mouth once daily.      fluticasone propionate (FLONASE) 50 mcg/actuation nasal spray 1 spray (50 mcg total) by Each Nostril route once daily. 9.9 mL 0    gemfibroziL (LOPID) 600 MG tablet Take 1 tablet by mouth twice daily 90 tablet 0    losartan (COZAAR) 50 MG tablet Take 1 tablet (50 mg total) by mouth once daily. 90 tablet 3    metoprolol succinate (TOPROL-XL) 25 MG 24 hr tablet Take 1 tablet (25 mg total) by mouth once daily. for 90 days 90 tablet 3    niacin (NIASPAN) 1000 MG CR tablet Take 1 tablet by mouth nightly 90 tablet 0    omega-3 fatty acids/fish oil (FISH OIL-OMEGA-3 FATTY ACIDS) 300-1,000 mg capsule Take 4 g by mouth once daily at 6am.      paroxetine (PAXIL) 20 MG tablet Take 1 tablet (20 mg total) by mouth once daily. 90 tablet 3       Social History     Socioeconomic History    Marital status:    Occupational History    Occupation: retired   Tobacco Use    Smoking status: Every Day    Smokeless tobacco: Never   Substance and Sexual Activity    Alcohol use: Yes     Comment: 4-5 a day    Drug use: Yes     Types: Marijuana        Family History   Problem Relation Age of Onset    Diabetes Mother     Heart attack Mother     Alcohol abuse Father     Alcohol abuse  Sister     Alcohol abuse Brother     Heart attack Brother         Patient Care Team:  Vivienne Russell MD as PCP - General (Family Medicine)  Vivienne Russell MD       Subjective:     Review of Systems   Constitutional: Negative.    HENT: Negative.     Eyes: Negative.    Respiratory: Negative.     Cardiovascular: Negative.    Gastrointestinal: Negative.    Genitourinary: Negative.    Musculoskeletal: Negative.    Skin: Negative.    Neurological: Negative.    Endo/Heme/Allergies: Negative.    Psychiatric/Behavioral: Negative.         Patient Reported Health Risk Assessment  What [age]  Are you male or female?: Male  During the past four weeks, how much have you been bothered by emotional problems such as feeling anxious, depressed, irritable, sad, or downhearted and blue?: Not at all  During the past five weeks, has your physical and/or emotional health limited your social activities with family, friends, neighbors, or groups?: Not at all  During the past four weeks, how much bodily pain have you generally had?: Mild pain  During the past four weeks, was someone available to help if you needed and wanted help?: Yes, as much as I wanted  During the past four weeks, what was the hardest physical activity you could do for at least two minutes?: Light  Can you get to places out of walking distance without help?  (For example, can you travel alone on buses or taxis, or drive your own car?): Yes  Can you go shopping for groceries or clothes without someone's help?: Yes  Can you prepare your own meals?: Yes  Can you do your own housework without help?: Yes  Because of any health problems, do you need the help of another person with your personal care needs such as eating, bathing, dressing, or getting around the house?: No  Can you handle your own money without help?: Yes  During the past four weeks, how would you rate your health in general?: Good  How have things been going for you during the past four  "weeks?: Good and bad parts about equal  Are you having difficulties driving your car?: No  Do you always fasten your seat belt when you are in a car?: Yes, usually  How often in the past four weeks have you been bothered by falling or dizzy when standing up?: Sometimes  How often in the past four weeks have you been bothered by sexual problems?: Never  How often in the past four weeks have you been bothered by trouble eating well?: Never  How often in the past four weeks have you been bothered by teeth or denture problems?: Never  How often in the past four weeks have you been bothered with problems using the telephone?: Never  How often in the past four weeks have you been bothered by tiredness or fatigue?: Never  Have you fallen two or more times in the past year?: No  Are you afraid of falling?: No  Are you a smoker?: Yes, I'm not ready to quit  During the past four weeks, how many drinks of wine, beer, or other alcoholic beverages did you have?: 6-9 drinks per week  Do you exercise for about 20 minutes three or more days a week?: Yes, some of the time  Have you been given any information to help you with hazards in your house that might hurt you?: No  Have you been given any information to help you with keeping track of your medications?: No  How often do you have trouble taking medicines the way you've been told to take them?: I always take them as prescribed  How confident are you that you can control and manage most of your health problems?: Very confident  What is your race? (Check all that apply.):     Objective:     BP (!) 144/90 (BP Location: Left arm)   Pulse 71   Temp 97.7 °F (36.5 °C) (Temporal)   Resp 16   Ht 5' 5" (1.651 m)   Wt 73.4 kg (161 lb 12.8 oz)   SpO2 97%   BMI 26.92 kg/m²     Physical Exam  Vitals and nursing note reviewed.   Constitutional:       Appearance: Normal appearance. He is normal weight.   HENT:      Head: Normocephalic.      Nose: Nose normal.      Mouth/Throat: "      Mouth: Mucous membranes are moist.      Pharynx: Oropharynx is clear.   Eyes:      Extraocular Movements: Extraocular movements intact.   Cardiovascular:      Rate and Rhythm: Normal rate and regular rhythm.   Pulmonary:      Effort: Pulmonary effort is normal.      Breath sounds: Normal breath sounds.   Musculoskeletal:         General: Normal range of motion.   Skin:     General: Skin is warm and dry.   Neurological:      General: No focal deficit present.      Mental Status: He is alert and oriented to person, place, and time. Mental status is at baseline.   Psychiatric:         Mood and Affect: Mood normal.         No flowsheet data found.  Fall Risk Assessment - Outpatient 7/12/2023 11/17/2022 7/8/2022 6/9/2022   Mobility Status Ambulatory Ambulatory Ambulatory Ambulatory w/ assistance   Number of falls 1 0 1 with injury 1   Identified as fall risk 0 0 1 0           Depression Screening  Over the past two weeks, has the patient felt down, depressed, or hopeless?: No  Over the past two weeks, has the patient felt little interest or pleasure in doing things?: No  Functional Ability/Safety Screening  Was the patient's timed Up & Go test unsteady or longer than 30 seconds?: No  Does the patient need help with phone, transportation, shopping, preparing meals, housework, laundry, meds, or managing money?: No  Does the patient's home have rugs in the hallway, lack grab bars in the bathroom, lack handrails on the stairs or have poor lighting?: No  Have you noticed any hearing difficulties?: No  Cognitive Function (Assessed through direct observation with due consideration of information obtained by way of patient reports and/or concerns raised by family, friends, caretakers, or others)    Does the patient repeat questions/statements in the same day?: No  Does the patient have trouble remembering the date, year, and time?: No  Does the patient have difficulty managing finances?: No  Does the patient have a  decreased sense of direction?: No  Assessment/Plan:       Medicare Annual Wellness and Personalized Prevention Plan:   Fall Risk + Home Safety + Hearing Impairment + Depression Screen + Cognitive Impairment Screen + Health Risk Assessment all reviewed.     Opioid Screening: Patient medication list reviewed, patient is not taking prescription opioids. Patient is not using additional opioids than prescribed. Patient is at low risk of substance abuse based on this opioid use history.         Health Maintenance Topics with due status: Not Due       Topic Last Completion Date    Influenza Vaccine 12/20/2019    TETANUS VACCINE 07/08/2021    High Dose Statin 11/17/2022    Aspirin/Antiplatelet Therapy 06/15/2023    Lipid Panel 07/08/2023      The patient's Health Maintenance was reviewed and the following appears to be due at this time:   Health Maintenance Due   Topic Date Due    Colorectal Cancer Screening  Never done    Abdominal Aortic Aneurysm Screening  Never done    COVID-19 Vaccine (3 - Moderna series) 04/27/2021         1. Medicare annual wellness visit, subsequent  Assessment & Plan:  Previously done labs reviewed with patient at time of appointment today  Prostate Specific Antigen (ng/mL)   Date Value   07/08/2022 0.54       Will request colonoscopy report again    Advanced care planning discussed and paperwork given    Orders:  -     CBC Auto Differential; Future; Expected date: 07/12/2024  -     Comprehensive Metabolic Panel; Future; Expected date: 07/12/2024  -     Lipid Panel; Future; Expected date: 07/12/2024  -     TSH; Future; Expected date: 07/12/2024  -     Hemoglobin A1C; Future; Expected date: 07/12/2024  -     Urinalysis; Future; Expected date: 07/12/2024  -     PSA, Screening; Future; Expected date: 07/12/2024  -     Vitamin D; Future; Expected date: 07/12/2024    2. Advanced care planning/counseling discussion  Assessment & Plan:  Advanced care planning discussed and paperwork given.    I attest  that I have had a face to face discussion with patient and or surrogate decision maker.   Included surrogate decision maker: NO  Advanced directive in chart: NO  LAPOST: NO    Total time spent: 16 minutes      Orders:  -     CBC Auto Differential; Future; Expected date: 07/12/2024  -     Comprehensive Metabolic Panel; Future; Expected date: 07/12/2024  -     Lipid Panel; Future; Expected date: 07/12/2024  -     TSH; Future; Expected date: 07/12/2024  -     Hemoglobin A1C; Future; Expected date: 07/12/2024  -     Urinalysis; Future; Expected date: 07/12/2024  -     PSA, Screening; Future; Expected date: 07/12/2024  -     Vitamin D; Future; Expected date: 07/12/2024    3. Primary hypertension  Assessment & Plan:  Stable on current prescription. On asa. Keep appointments with cards    Orders:  -     CBC Auto Differential; Future; Expected date: 07/12/2024  -     Comprehensive Metabolic Panel; Future; Expected date: 07/12/2024  -     Lipid Panel; Future; Expected date: 07/12/2024  -     TSH; Future; Expected date: 07/12/2024  -     Hemoglobin A1C; Future; Expected date: 07/12/2024  -     Urinalysis; Future; Expected date: 07/12/2024  -     PSA, Screening; Future; Expected date: 07/12/2024  -     Vitamin D; Future; Expected date: 07/12/2024    4. Mixed hyperlipidemia  Assessment & Plan:  Stable on current prescription.  Keep appointments with cards    Orders:  -     CBC Auto Differential; Future; Expected date: 07/12/2024  -     Comprehensive Metabolic Panel; Future; Expected date: 07/12/2024  -     Lipid Panel; Future; Expected date: 07/12/2024  -     TSH; Future; Expected date: 07/12/2024  -     Hemoglobin A1C; Future; Expected date: 07/12/2024  -     Urinalysis; Future; Expected date: 07/12/2024  -     PSA, Screening; Future; Expected date: 07/12/2024  -     Vitamin D; Future; Expected date: 07/12/2024    5. Coronary artery disease, unspecified vessel or lesion type, unspecified whether angina present, unspecified  whether native or transplanted heart  Assessment & Plan:  Stable on current prescription. On asa. Keep appointments with cards    Orders:  -     CBC Auto Differential; Future; Expected date: 07/12/2024  -     Comprehensive Metabolic Panel; Future; Expected date: 07/12/2024  -     Lipid Panel; Future; Expected date: 07/12/2024  -     TSH; Future; Expected date: 07/12/2024  -     Hemoglobin A1C; Future; Expected date: 07/12/2024  -     Urinalysis; Future; Expected date: 07/12/2024  -     PSA, Screening; Future; Expected date: 07/12/2024  -     Vitamin D; Future; Expected date: 07/12/2024    6. Depressive disorder  Assessment & Plan:  Stable on paxil    Orders:  -     CBC Auto Differential; Future; Expected date: 07/12/2024  -     Comprehensive Metabolic Panel; Future; Expected date: 07/12/2024  -     Lipid Panel; Future; Expected date: 07/12/2024  -     TSH; Future; Expected date: 07/12/2024  -     Hemoglobin A1C; Future; Expected date: 07/12/2024  -     Urinalysis; Future; Expected date: 07/12/2024  -     PSA, Screening; Future; Expected date: 07/12/2024  -     Vitamin D; Future; Expected date: 07/12/2024    7. Prostate cancer screening  -     CBC Auto Differential; Future; Expected date: 07/12/2024  -     Comprehensive Metabolic Panel; Future; Expected date: 07/12/2024  -     Lipid Panel; Future; Expected date: 07/12/2024  -     TSH; Future; Expected date: 07/12/2024  -     Hemoglobin A1C; Future; Expected date: 07/12/2024  -     Urinalysis; Future; Expected date: 07/12/2024  -     PSA, Screening; Future; Expected date: 07/12/2024  -     Vitamin D; Future; Expected date: 07/12/2024    8. Gastroesophageal reflux disease, unspecified whether esophagitis present  Assessment & Plan:  Stable on ppi    Orders:  -     CBC Auto Differential; Future; Expected date: 07/12/2024  -     Comprehensive Metabolic Panel; Future; Expected date: 07/12/2024  -     Lipid Panel; Future; Expected date: 07/12/2024  -     TSH; Future;  Expected date: 07/12/2024  -     Hemoglobin A1C; Future; Expected date: 07/12/2024  -     Urinalysis; Future; Expected date: 07/12/2024  -     PSA, Screening; Future; Expected date: 07/12/2024  -     Vitamin D; Future; Expected date: 07/12/2024    9. Low vitamin D level  Assessment & Plan:  Continue to supplement otc    Orders:  -     CBC Auto Differential; Future; Expected date: 07/12/2024  -     Comprehensive Metabolic Panel; Future; Expected date: 07/12/2024  -     Lipid Panel; Future; Expected date: 07/12/2024  -     TSH; Future; Expected date: 07/12/2024  -     Hemoglobin A1C; Future; Expected date: 07/12/2024  -     Urinalysis; Future; Expected date: 07/12/2024  -     PSA, Screening; Future; Expected date: 07/12/2024  -     Vitamin D; Future; Expected date: 07/12/2024    10. Vitamin D deficiency, unspecified  -     Vitamin D; Future; Expected date: 07/12/2024    11. History of CVA (cerebrovascular accident)  Assessment & Plan:  States after MI.  Was previously depakote and saw neuro- dr. Clifford.  Weaned himself off meds >1 year ago.  Denies tremors.        12. History of myocardial infarction  Assessment & Plan:  Stable. Keep appts with cards           Advance Care Planning   I attest to discussing Advance Care Planning with patient and/or family member.  Education was provided including the importance of the Health Care Power of , Advance Directives, and/or [place] documentation.  The patient expressed understanding to the importance of this information and discussion.  Length of ACP conversation in minutes: 16         Medication List with Changes/Refills   Current Medications    ASPIRIN (ECOTRIN) 81 MG EC TABLET    Take 81 mg by mouth once daily.       Start Date: --        End Date: --    ATORVASTATIN (LIPITOR) 10 MG TABLET    Take 10 mg by mouth once daily at 6am.       Start Date: --        End Date: --    CA COMB NO.1/VIT D3/B6/FA/B12 (HEARTBURN & ACID REFLUX ORAL)    Take by mouth. Unknown of  medication name       Start Date: --        End Date: --    CALCIUM CITRATE-VITAMIN D3 250 MG-5 MCG (200 UNIT) TAB    Take 1 tablet by mouth once daily.       Start Date: --        End Date: --    ESOMEPRAZOLE (NEXIUM) 20 MG CAPSULE    Take 20 mg by mouth once daily.       Start Date: --        End Date: --    FLUTICASONE PROPIONATE (FLONASE) 50 MCG/ACTUATION NASAL SPRAY    1 spray (50 mcg total) by Each Nostril route once daily.       Start Date: 6/15/2023 End Date: --    GEMFIBROZIL (LOPID) 600 MG TABLET    Take 1 tablet by mouth twice daily       Start Date: 6/28/2023 End Date: --    LOSARTAN (COZAAR) 50 MG TABLET    Take 1 tablet (50 mg total) by mouth once daily.       Start Date: 12/9/2022 End Date: --    METOPROLOL SUCCINATE (TOPROL-XL) 25 MG 24 HR TABLET    Take 1 tablet (25 mg total) by mouth once daily. for 90 days       Start Date: 10/3/2022 End Date: --    NIACIN (NIASPAN) 1000 MG CR TABLET    Take 1 tablet by mouth nightly       Start Date: 5/1/2023  End Date: --    OMEGA-3 FATTY ACIDS/FISH OIL (FISH OIL-OMEGA-3 FATTY ACIDS) 300-1,000 MG CAPSULE    Take 4 g by mouth once daily at 6am.       Start Date: --        End Date: --    PAROXETINE (PAXIL) 20 MG TABLET    Take 1 tablet (20 mg total) by mouth once daily.       Start Date: 6/15/2023 End Date: --        Follow up in about 1 year (around 7/12/2024) for Medicare Wellness with labs. In addition to their scheduled follow up, the patient has also been instructed to follow up on as needed basis.

## 2023-07-12 NOTE — ASSESSMENT & PLAN NOTE
States after MI.  Was previously depakote and saw neuro- dr. Clifford.  Weaned himself off meds >1 year ago.  Denies tremors.

## 2023-07-12 NOTE — ASSESSMENT & PLAN NOTE
Previously done labs reviewed with patient at time of appointment today  Prostate Specific Antigen (ng/mL)   Date Value   07/08/2022 0.54       Will request colonoscopy report again    Advanced care planning discussed and paperwork given

## 2023-08-16 RX ORDER — GEMFIBROZIL 600 MG/1
TABLET, FILM COATED ORAL
Qty: 90 TABLET | Refills: 3 | Status: SHIPPED | OUTPATIENT
Start: 2023-08-16 | End: 2024-02-19

## 2023-09-05 ENCOUNTER — DOCUMENTATION ONLY (OUTPATIENT)
Dept: ADMINISTRATIVE | Facility: HOSPITAL | Age: 73
End: 2023-09-05
Payer: MEDICARE

## 2023-09-14 RX ORDER — NIACIN 1000 MG/1
TABLET, EXTENDED RELEASE ORAL
Qty: 90 TABLET | Refills: 0 | Status: SHIPPED | OUTPATIENT
Start: 2023-09-14

## 2023-10-16 PROBLEM — Z00.00 MEDICARE ANNUAL WELLNESS VISIT, SUBSEQUENT: Status: RESOLVED | Noted: 2022-07-08 | Resolved: 2023-10-16

## 2023-11-21 RX ORDER — METOPROLOL SUCCINATE 25 MG/1
25 TABLET, EXTENDED RELEASE ORAL
Qty: 90 TABLET | Refills: 0 | Status: SHIPPED | OUTPATIENT
Start: 2023-11-21 | End: 2024-02-19

## 2023-11-28 RX ORDER — LOSARTAN POTASSIUM 50 MG/1
50 TABLET ORAL
Qty: 90 TABLET | Refills: 0 | Status: SHIPPED | OUTPATIENT
Start: 2023-11-28 | End: 2024-03-04

## 2023-12-07 ENCOUNTER — OFFICE VISIT (OUTPATIENT)
Dept: CARDIAC SURGERY | Facility: CLINIC | Age: 73
End: 2023-12-07
Attending: THORACIC SURGERY (CARDIOTHORACIC VASCULAR SURGERY)
Payer: MEDICARE

## 2023-12-07 VITALS
SYSTOLIC BLOOD PRESSURE: 190 MMHG | HEART RATE: 82 BPM | OXYGEN SATURATION: 97 % | HEIGHT: 65 IN | BODY MASS INDEX: 26.66 KG/M2 | DIASTOLIC BLOOD PRESSURE: 100 MMHG | WEIGHT: 160 LBS

## 2023-12-07 DIAGNOSIS — I65.23 BILATERAL CAROTID ARTERY STENOSIS: Primary | ICD-10-CM

## 2023-12-07 PROCEDURE — 3077F SYST BP >= 140 MM HG: CPT | Mod: CPTII,,, | Performed by: THORACIC SURGERY (CARDIOTHORACIC VASCULAR SURGERY)

## 2023-12-07 PROCEDURE — 1160F PR REVIEW ALL MEDS BY PRESCRIBER/CLIN PHARMACIST DOCUMENTED: ICD-10-PCS | Mod: CPTII,,, | Performed by: THORACIC SURGERY (CARDIOTHORACIC VASCULAR SURGERY)

## 2023-12-07 PROCEDURE — 3080F DIAST BP >= 90 MM HG: CPT | Mod: CPTII,,, | Performed by: THORACIC SURGERY (CARDIOTHORACIC VASCULAR SURGERY)

## 2023-12-07 PROCEDURE — 1126F PR PAIN SEVERITY QUANTIFIED, NO PAIN PRESENT: ICD-10-PCS | Mod: CPTII,,, | Performed by: THORACIC SURGERY (CARDIOTHORACIC VASCULAR SURGERY)

## 2023-12-07 PROCEDURE — 3080F PR MOST RECENT DIASTOLIC BLOOD PRESSURE >= 90 MM HG: ICD-10-PCS | Mod: CPTII,,, | Performed by: THORACIC SURGERY (CARDIOTHORACIC VASCULAR SURGERY)

## 2023-12-07 PROCEDURE — 4010F ACE/ARB THERAPY RXD/TAKEN: CPT | Mod: CPTII,,, | Performed by: THORACIC SURGERY (CARDIOTHORACIC VASCULAR SURGERY)

## 2023-12-07 PROCEDURE — 3288F PR FALLS RISK ASSESSMENT DOCUMENTED: ICD-10-PCS | Mod: CPTII,,, | Performed by: THORACIC SURGERY (CARDIOTHORACIC VASCULAR SURGERY)

## 2023-12-07 PROCEDURE — 1101F PR PT FALLS ASSESS DOC 0-1 FALLS W/OUT INJ PAST YR: ICD-10-PCS | Mod: CPTII,,, | Performed by: THORACIC SURGERY (CARDIOTHORACIC VASCULAR SURGERY)

## 2023-12-07 PROCEDURE — 99214 OFFICE O/P EST MOD 30 MIN: CPT | Mod: ,,, | Performed by: THORACIC SURGERY (CARDIOTHORACIC VASCULAR SURGERY)

## 2023-12-07 PROCEDURE — 1159F MED LIST DOCD IN RCRD: CPT | Mod: CPTII,,, | Performed by: THORACIC SURGERY (CARDIOTHORACIC VASCULAR SURGERY)

## 2023-12-07 PROCEDURE — 4010F PR ACE/ARB THEARPY RXD/TAKEN: ICD-10-PCS | Mod: CPTII,,, | Performed by: THORACIC SURGERY (CARDIOTHORACIC VASCULAR SURGERY)

## 2023-12-07 PROCEDURE — 3077F PR MOST RECENT SYSTOLIC BLOOD PRESSURE >= 140 MM HG: ICD-10-PCS | Mod: CPTII,,, | Performed by: THORACIC SURGERY (CARDIOTHORACIC VASCULAR SURGERY)

## 2023-12-07 PROCEDURE — 3008F PR BODY MASS INDEX (BMI) DOCUMENTED: ICD-10-PCS | Mod: CPTII,,, | Performed by: THORACIC SURGERY (CARDIOTHORACIC VASCULAR SURGERY)

## 2023-12-07 PROCEDURE — 1160F RVW MEDS BY RX/DR IN RCRD: CPT | Mod: CPTII,,, | Performed by: THORACIC SURGERY (CARDIOTHORACIC VASCULAR SURGERY)

## 2023-12-07 PROCEDURE — 3008F BODY MASS INDEX DOCD: CPT | Mod: CPTII,,, | Performed by: THORACIC SURGERY (CARDIOTHORACIC VASCULAR SURGERY)

## 2023-12-07 PROCEDURE — 1126F AMNT PAIN NOTED NONE PRSNT: CPT | Mod: CPTII,,, | Performed by: THORACIC SURGERY (CARDIOTHORACIC VASCULAR SURGERY)

## 2023-12-07 PROCEDURE — 1159F PR MEDICATION LIST DOCUMENTED IN MEDICAL RECORD: ICD-10-PCS | Mod: CPTII,,, | Performed by: THORACIC SURGERY (CARDIOTHORACIC VASCULAR SURGERY)

## 2023-12-07 PROCEDURE — 99214 PR OFFICE/OUTPT VISIT, EST, LEVL IV, 30-39 MIN: ICD-10-PCS | Mod: ,,, | Performed by: THORACIC SURGERY (CARDIOTHORACIC VASCULAR SURGERY)

## 2023-12-07 PROCEDURE — 3288F FALL RISK ASSESSMENT DOCD: CPT | Mod: CPTII,,, | Performed by: THORACIC SURGERY (CARDIOTHORACIC VASCULAR SURGERY)

## 2023-12-07 PROCEDURE — 1101F PT FALLS ASSESS-DOCD LE1/YR: CPT | Mod: CPTII,,, | Performed by: THORACIC SURGERY (CARDIOTHORACIC VASCULAR SURGERY)

## 2023-12-07 NOTE — ASSESSMENT & PLAN NOTE
Continue dual antiplatelet therapy and statin therapy.  Return to clinic in 1 year with a carotid ultrasound.  Carotid ultrasound performed today reveals no evidence of significant flow-limiting disease.

## 2023-12-07 NOTE — PROGRESS NOTES
Patient status post CVA and carotid endarterectomy.  He is doing well without complaints   Vital signs stable/afebrile   Regular rate and rhythm without murmurs rubs or gallops  Coarse breath sounds bilaterally   Wounds CDI   Ultrasound reviewed .  No evidence of flow-limiting disease.  A/P:  Doing well without complaints.  Return to clinic in 1 year with carotid ultrasound.

## 2024-02-19 RX ORDER — METOPROLOL SUCCINATE 25 MG/1
25 TABLET, EXTENDED RELEASE ORAL
Qty: 90 TABLET | Refills: 0 | Status: SHIPPED | OUTPATIENT
Start: 2024-02-19

## 2024-02-19 RX ORDER — GEMFIBROZIL 600 MG/1
TABLET, FILM COATED ORAL
Qty: 180 TABLET | Refills: 0 | Status: SHIPPED | OUTPATIENT
Start: 2024-02-19

## 2024-03-04 RX ORDER — LOSARTAN POTASSIUM 50 MG/1
50 TABLET ORAL
Qty: 90 TABLET | Refills: 0 | Status: SHIPPED | OUTPATIENT
Start: 2024-03-04 | End: 2024-05-30

## 2024-05-30 RX ORDER — LOSARTAN POTASSIUM 50 MG/1
50 TABLET ORAL
Qty: 90 TABLET | Refills: 0 | Status: SHIPPED | OUTPATIENT
Start: 2024-05-30

## 2024-06-04 DIAGNOSIS — E78.5 HYPERLIPIDEMIA, UNSPECIFIED HYPERLIPIDEMIA TYPE: ICD-10-CM

## 2024-06-04 DIAGNOSIS — Z00.00 MEDICARE ANNUAL WELLNESS VISIT, SUBSEQUENT: ICD-10-CM

## 2024-06-04 DIAGNOSIS — R79.89 LOW VITAMIN D LEVEL: ICD-10-CM

## 2024-06-04 RX ORDER — PAROXETINE HYDROCHLORIDE 20 MG/1
20 TABLET, FILM COATED ORAL
Qty: 90 TABLET | Refills: 0 | Status: SHIPPED | OUTPATIENT
Start: 2024-06-04

## 2024-06-28 ENCOUNTER — PATIENT OUTREACH (OUTPATIENT)
Facility: CLINIC | Age: 74
End: 2024-06-28
Payer: MEDICARE

## 2024-06-28 NOTE — LETTER
AUTHORIZATION FOR RELEASE OF   CONFIDENTIAL INFORMATION    Dear Dr Sharath Tolbert,    We are seeing Joao Ignacio Jr, date of birth 1950, in the clinic at [facility]. Vivienne Russell MD is the patient's PCP. Joao Ignacio Jr has an outstanding lab/procedure at the time we reviewed his chart. In order to help keep his health information updated, he has authorized us to request the following medical record(s):       ( X )  COLONOSCOPY/ ANY ASSOCIATED PATHOLOGY            ( X ) RECALL DATE               Please fax record(s) to [phone].     If you have any questions, please contact me at [phone].    Thank you,     LYNN Salomon,   Care Coordinator, [facility]             Patient Name: Joao Ignacio Jr  : 1950  Patient Phone #: [phone]

## 2024-06-28 NOTE — PROGRESS NOTES
Health Maintenance Topic(s) Outreach Outcomes & Actions Taken:    Colorectal Cancer Screening - Outreach Outcomes & Actions Taken  : External Records Requested & Care Team Updated if Applicable       Additional Notes:  Population health chart review for health maintenance.     Health Maintenance Topics Overdue:    VBHM Score: 3   Colon Cancer Screening- Request sent to Dr Sharath Tolbert for colonoscopy  Uncontrolled BP  AAA Screening    SDOH Incomplete  PCP f/u 7/16/2024         Care Management, Digital Medicine, and/or Education Referrals      Next Steps - Referral Actions: Digital Medicine Outcomes and Actions Taken: Pt Declined or Not Eligible

## 2024-07-02 ENCOUNTER — TELEPHONE (OUTPATIENT)
Dept: FAMILY MEDICINE | Facility: CLINIC | Age: 74
End: 2024-07-02
Payer: MEDICARE

## 2024-07-09 ENCOUNTER — LAB VISIT (OUTPATIENT)
Dept: LAB | Facility: HOSPITAL | Age: 74
End: 2024-07-09
Attending: FAMILY MEDICINE
Payer: MEDICARE

## 2024-07-09 DIAGNOSIS — F32.A DEPRESSIVE DISORDER: ICD-10-CM

## 2024-07-09 DIAGNOSIS — Z00.00 MEDICARE ANNUAL WELLNESS VISIT, SUBSEQUENT: ICD-10-CM

## 2024-07-09 DIAGNOSIS — I25.10 CORONARY ARTERY DISEASE, UNSPECIFIED VESSEL OR LESION TYPE, UNSPECIFIED WHETHER ANGINA PRESENT, UNSPECIFIED WHETHER NATIVE OR TRANSPLANTED HEART: ICD-10-CM

## 2024-07-09 DIAGNOSIS — Z71.89 ADVANCED CARE PLANNING/COUNSELING DISCUSSION: ICD-10-CM

## 2024-07-09 DIAGNOSIS — R79.89 LOW VITAMIN D LEVEL: ICD-10-CM

## 2024-07-09 DIAGNOSIS — E55.9 VITAMIN D DEFICIENCY, UNSPECIFIED: ICD-10-CM

## 2024-07-09 DIAGNOSIS — Z12.5 PROSTATE CANCER SCREENING: ICD-10-CM

## 2024-07-09 DIAGNOSIS — E78.2 MIXED HYPERLIPIDEMIA: ICD-10-CM

## 2024-07-09 DIAGNOSIS — I10 PRIMARY HYPERTENSION: ICD-10-CM

## 2024-07-09 DIAGNOSIS — K21.9 GASTROESOPHAGEAL REFLUX DISEASE, UNSPECIFIED WHETHER ESOPHAGITIS PRESENT: ICD-10-CM

## 2024-07-09 LAB
25(OH)D3+25(OH)D2 SERPL-MCNC: 34 NG/ML (ref 30–80)
ALBUMIN SERPL-MCNC: 4.1 G/DL (ref 3.4–4.8)
ALBUMIN/GLOB SERPL: 1.1 RATIO (ref 1.1–2)
ALP SERPL-CCNC: 64 UNIT/L (ref 40–150)
ALT SERPL-CCNC: 19 UNIT/L (ref 0–55)
ANION GAP SERPL CALC-SCNC: 9 MEQ/L
AST SERPL-CCNC: 30 UNIT/L (ref 5–34)
BASOPHILS # BLD AUTO: 0.02 X10(3)/MCL
BASOPHILS NFR BLD AUTO: 0.5 %
BILIRUB SERPL-MCNC: 0.4 MG/DL
BUN SERPL-MCNC: 11.1 MG/DL (ref 8.4–25.7)
CALCIUM SERPL-MCNC: 9.5 MG/DL (ref 8.8–10)
CHLORIDE SERPL-SCNC: 102 MMOL/L (ref 98–107)
CHOLEST SERPL-MCNC: 162 MG/DL
CHOLEST/HDLC SERPL: 3 {RATIO} (ref 0–5)
CO2 SERPL-SCNC: 27 MMOL/L (ref 23–31)
CREAT SERPL-MCNC: 0.93 MG/DL (ref 0.73–1.18)
CREAT/UREA NIT SERPL: 12
EOSINOPHIL # BLD AUTO: 0.37 X10(3)/MCL (ref 0–0.9)
EOSINOPHIL NFR BLD AUTO: 9.2 %
ERYTHROCYTE [DISTWIDTH] IN BLOOD BY AUTOMATED COUNT: 13.2 % (ref 11.5–17)
EST. AVERAGE GLUCOSE BLD GHB EST-MCNC: 102.5 MG/DL
GFR SERPLBLD CREATININE-BSD FMLA CKD-EPI: >60 ML/MIN/1.73/M2
GLOBULIN SER-MCNC: 3.6 GM/DL (ref 2.4–3.5)
GLUCOSE SERPL-MCNC: 102 MG/DL (ref 82–115)
HBA1C MFR BLD: 5.2 %
HCT VFR BLD AUTO: 44.2 % (ref 42–52)
HDLC SERPL-MCNC: 64 MG/DL (ref 35–60)
HGB BLD-MCNC: 15 G/DL (ref 14–18)
IMM GRANULOCYTES # BLD AUTO: 0.01 X10(3)/MCL (ref 0–0.04)
IMM GRANULOCYTES NFR BLD AUTO: 0.2 %
LDLC SERPL CALC-MCNC: 88 MG/DL (ref 50–140)
LYMPHOCYTES # BLD AUTO: 1.28 X10(3)/MCL (ref 0.6–4.6)
LYMPHOCYTES NFR BLD AUTO: 31.8 %
MCH RBC QN AUTO: 31 PG (ref 27–31)
MCHC RBC AUTO-ENTMCNC: 33.9 G/DL (ref 33–36)
MCV RBC AUTO: 91.3 FL (ref 80–94)
MONOCYTES # BLD AUTO: 0.36 X10(3)/MCL (ref 0.1–1.3)
MONOCYTES NFR BLD AUTO: 8.9 %
NEUTROPHILS # BLD AUTO: 1.99 X10(3)/MCL (ref 2.1–9.2)
NEUTROPHILS NFR BLD AUTO: 49.4 %
NRBC BLD AUTO-RTO: 0 %
PLATELET # BLD AUTO: 191 X10(3)/MCL (ref 130–400)
PMV BLD AUTO: 10.1 FL (ref 7.4–10.4)
POTASSIUM SERPL-SCNC: 4 MMOL/L (ref 3.5–5.1)
PROT SERPL-MCNC: 7.7 GM/DL (ref 5.8–7.6)
PSA SERPL-MCNC: 0.56 NG/ML
RBC # BLD AUTO: 4.84 X10(6)/MCL (ref 4.7–6.1)
SODIUM SERPL-SCNC: 138 MMOL/L (ref 136–145)
TRIGL SERPL-MCNC: 49 MG/DL (ref 34–140)
TSH SERPL-ACNC: 1.21 UIU/ML (ref 0.35–4.94)
VLDLC SERPL CALC-MCNC: 10 MG/DL
WBC # BLD AUTO: 4.03 X10(3)/MCL (ref 4.5–11.5)

## 2024-07-09 PROCEDURE — 80061 LIPID PANEL: CPT

## 2024-07-09 PROCEDURE — 84153 ASSAY OF PSA TOTAL: CPT

## 2024-07-09 PROCEDURE — 80053 COMPREHEN METABOLIC PANEL: CPT

## 2024-07-09 PROCEDURE — 82306 VITAMIN D 25 HYDROXY: CPT

## 2024-07-09 PROCEDURE — 36415 COLL VENOUS BLD VENIPUNCTURE: CPT

## 2024-07-09 PROCEDURE — 83036 HEMOGLOBIN GLYCOSYLATED A1C: CPT

## 2024-07-09 PROCEDURE — 84443 ASSAY THYROID STIM HORMONE: CPT

## 2024-07-09 PROCEDURE — 85025 COMPLETE CBC W/AUTO DIFF WBC: CPT

## 2024-07-16 ENCOUNTER — OFFICE VISIT (OUTPATIENT)
Dept: FAMILY MEDICINE | Facility: CLINIC | Age: 74
End: 2024-07-16
Payer: MEDICARE

## 2024-07-16 VITALS
OXYGEN SATURATION: 97 % | DIASTOLIC BLOOD PRESSURE: 82 MMHG | HEART RATE: 92 BPM | WEIGHT: 163.38 LBS | HEIGHT: 65 IN | TEMPERATURE: 98 F | RESPIRATION RATE: 16 BRPM | BODY MASS INDEX: 27.22 KG/M2 | SYSTOLIC BLOOD PRESSURE: 136 MMHG

## 2024-07-16 DIAGNOSIS — Z00.00 MEDICARE ANNUAL WELLNESS VISIT, SUBSEQUENT: Primary | ICD-10-CM

## 2024-07-16 DIAGNOSIS — Z12.11 COLON CANCER SCREENING: ICD-10-CM

## 2024-07-16 DIAGNOSIS — L29.3 ITCHING OF MALE GENITALIA: ICD-10-CM

## 2024-07-16 DIAGNOSIS — Z12.5 PROSTATE CANCER SCREENING: ICD-10-CM

## 2024-07-16 DIAGNOSIS — Z71.89 ADVANCED CARE PLANNING/COUNSELING DISCUSSION: ICD-10-CM

## 2024-07-16 DIAGNOSIS — I25.10 CORONARY ARTERY DISEASE, UNSPECIFIED VESSEL OR LESION TYPE, UNSPECIFIED WHETHER ANGINA PRESENT, UNSPECIFIED WHETHER NATIVE OR TRANSPLANTED HEART: ICD-10-CM

## 2024-07-16 DIAGNOSIS — I25.2 HISTORY OF MYOCARDIAL INFARCTION: ICD-10-CM

## 2024-07-16 DIAGNOSIS — R79.89 LOW VITAMIN D LEVEL: ICD-10-CM

## 2024-07-16 DIAGNOSIS — E78.2 MIXED HYPERLIPIDEMIA: ICD-10-CM

## 2024-07-16 DIAGNOSIS — K21.9 GASTROESOPHAGEAL REFLUX DISEASE, UNSPECIFIED WHETHER ESOPHAGITIS PRESENT: ICD-10-CM

## 2024-07-16 DIAGNOSIS — I10 PRIMARY HYPERTENSION: ICD-10-CM

## 2024-07-16 DIAGNOSIS — E55.9 VITAMIN D DEFICIENCY, UNSPECIFIED: ICD-10-CM

## 2024-07-16 DIAGNOSIS — I65.23 BILATERAL CAROTID ARTERY STENOSIS: ICD-10-CM

## 2024-07-16 DIAGNOSIS — Z86.73 HISTORY OF CVA (CEREBROVASCULAR ACCIDENT): ICD-10-CM

## 2024-07-16 DIAGNOSIS — F32.A DEPRESSIVE DISORDER: ICD-10-CM

## 2024-07-16 LAB
BACTERIA #/AREA URNS AUTO: ABNORMAL /HPF
BILIRUB UR QL STRIP.AUTO: NEGATIVE
C TRACH DNA SPEC QL NAA+PROBE: NOT DETECTED
CLARITY UR: CLEAR
COLOR UR AUTO: ABNORMAL
GLUCOSE UR QL STRIP: NORMAL
HGB UR QL STRIP: NEGATIVE
KETONES UR QL STRIP: NEGATIVE
LEUKOCYTE ESTERASE UR QL STRIP: NEGATIVE
N GONORRHOEA DNA SPEC QL NAA+PROBE: NOT DETECTED
NITRITE UR QL STRIP: NEGATIVE
PH UR STRIP: 6.5 [PH]
PROT UR QL STRIP: ABNORMAL
RBC #/AREA URNS AUTO: ABNORMAL /HPF
SOURCE (OHS): NORMAL
SP GR UR STRIP.AUTO: 1.01 (ref 1–1.03)
SQUAMOUS #/AREA URNS LPF: ABNORMAL /HPF
UROBILINOGEN UR STRIP-ACNC: NORMAL
WBC #/AREA URNS AUTO: ABNORMAL /HPF

## 2024-07-16 PROCEDURE — 1126F AMNT PAIN NOTED NONE PRSNT: CPT | Mod: CPTII,,, | Performed by: FAMILY MEDICINE

## 2024-07-16 PROCEDURE — 1159F MED LIST DOCD IN RCRD: CPT | Mod: CPTII,,, | Performed by: FAMILY MEDICINE

## 2024-07-16 PROCEDURE — 3075F SYST BP GE 130 - 139MM HG: CPT | Mod: CPTII,,, | Performed by: FAMILY MEDICINE

## 2024-07-16 PROCEDURE — 3079F DIAST BP 80-89 MM HG: CPT | Mod: CPTII,,, | Performed by: FAMILY MEDICINE

## 2024-07-16 PROCEDURE — 3044F HG A1C LEVEL LT 7.0%: CPT | Mod: CPTII,,, | Performed by: FAMILY MEDICINE

## 2024-07-16 PROCEDURE — 87591 N.GONORRHOEAE DNA AMP PROB: CPT | Performed by: FAMILY MEDICINE

## 2024-07-16 PROCEDURE — G0439 PPPS, SUBSEQ VISIT: HCPCS | Mod: ,,, | Performed by: FAMILY MEDICINE

## 2024-07-16 PROCEDURE — 4010F ACE/ARB THERAPY RXD/TAKEN: CPT | Mod: CPTII,,, | Performed by: FAMILY MEDICINE

## 2024-07-16 PROCEDURE — 3008F BODY MASS INDEX DOCD: CPT | Mod: CPTII,,, | Performed by: FAMILY MEDICINE

## 2024-07-16 PROCEDURE — 81001 URINALYSIS AUTO W/SCOPE: CPT | Performed by: FAMILY MEDICINE

## 2024-07-16 PROCEDURE — 87491 CHLMYD TRACH DNA AMP PROBE: CPT | Performed by: FAMILY MEDICINE

## 2024-07-16 PROCEDURE — 3288F FALL RISK ASSESSMENT DOCD: CPT | Mod: CPTII,,, | Performed by: FAMILY MEDICINE

## 2024-07-16 PROCEDURE — 87661 TRICHOMONAS VAGINALIS AMPLIF: CPT | Performed by: FAMILY MEDICINE

## 2024-07-16 PROCEDURE — 1101F PT FALLS ASSESS-DOCD LE1/YR: CPT | Mod: CPTII,,, | Performed by: FAMILY MEDICINE

## 2024-07-16 PROCEDURE — 99213 OFFICE O/P EST LOW 20 MIN: CPT | Mod: 25,,, | Performed by: FAMILY MEDICINE

## 2024-07-16 RX ORDER — GEMFIBROZIL 600 MG/1
600 TABLET, FILM COATED ORAL 2 TIMES DAILY
Qty: 180 TABLET | Refills: 3 | Status: SHIPPED | OUTPATIENT
Start: 2024-07-16 | End: 2025-07-16

## 2024-07-16 RX ORDER — METOPROLOL SUCCINATE 25 MG/1
25 TABLET, EXTENDED RELEASE ORAL DAILY
Qty: 90 TABLET | Refills: 3 | Status: SHIPPED | OUTPATIENT
Start: 2024-07-16 | End: 2025-07-16

## 2024-07-16 NOTE — ASSESSMENT & PLAN NOTE
"Stable on current prescription.  Keep appointments with cards    Lab Results   Component Value Date    CHOL 162 07/09/2024    CHOL 150 07/08/2023    CHOL 201 (H) 06/30/2022     Lab Results   Component Value Date    HDL 64 (H) 07/09/2024    HDL 57 07/08/2023    HDL 56 06/30/2022     No results found for: "LDLCALC"  Lab Results   Component Value Date    TRIG 49 07/09/2024    TRIG 69 07/08/2023    TRIG 130 06/30/2022       No results found for: "CHOLHDL"    "

## 2024-07-16 NOTE — PROGRESS NOTES
Patient ID: [id number]     Chief Complaint: Medicare AWV (Wellness )      HPI:     Joao Ignacio Jr is a 74 y.o. male here today for a Medicare Wellness.      presents to the clinic unaccompanied for his wellness visit. he did labs already.  they were reviewed with the patient at the time of his appt today.     C/o genital itch that will no go away x couple of months.  No discharge.  No dysuria.  Is sexually active. Concerned for std.  Never std before. No one told him to get checked. No lesions.       Patient has hypertension, HLD, CAD, carotid stenosis as well as history of MI. Denies PVD. He reports he saw Dr. Vaughan for right carotid artery occlusion. Sees him annually in November.  Will now be seeing dr. Matthews. Has appt 12/2024. Does US. He monitors his blood pressures at home and states they are normally 130s/70s. He takes his metoprolol BID and his losartan 25mg in the morning from dr. samayoa. He is on a baby aspirin. his cardiologist Dr. Samayoa. he sees him annually. LOV 6/2024. Has follow up in 12/2024.      He also has a history of mini strokes after his heart attack. He is no longer on Depakote or valproic acid. he stopped valproic acid > 1 year ago (weaned himself off). denies tremors. feeling well. He saw Dr. Clifford. does not have scheduled follow up.     He also has depression and is on Paxil. He is currently taking half of a 20 mg tablet (10mg) daily. He is happy with his current dosing. He states that the higher dose made him jittery.     Has hypovitaminosis D.  Supplements otc.  Level 7/2024 was low normal.      He has acid reflux and is on nexium 20mg.     The patient reports that he is not smoking cigarettes. He drinks alcohol on occasion. He is smoking pot sometimes.     He is UTD on his pneumonia vaccinations. declines shingles today. He reports he had a colonoscopy done 5 years ago in [place] with Dr. Sharath Tolbert. we will request record again. We will refer to gi now for screening colonoscopy.  "Denies problems.      He is not allergic to any medications.          Past Surgical History:   Procedure Laterality Date    ANGIOGRAM, CORONARY, WITH LEFT HEART CATHETERIZATION      CAROTID ARTERY ENDARTERECTOMY  10/14/2016    COLONOSCOPY      CORONARY ARTERY BYPASS GRAFT      HERNIA REPAIR      RT ELBOW SURGERY      TRIPLE BYPASS HEART SURGERY         Review of patient's allergies indicates:  No Known Allergies    Outpatient Medications Marked as Taking for the 7/16/24 encounter (Office Visit) with Vivienne Russell MD   Medication Sig Dispense Refill    aspirin (ECOTRIN) 81 MG EC tablet Take 81 mg by mouth once daily.      atorvastatin (LIPITOR) 10 MG tablet Take 10 mg by mouth once daily at 6am.      Ca comb no.1/vit D3/B6/FA/B12 (HEARTBURN & ACID REFLUX ORAL) Take by mouth. Unknown of medication name      calcium citrate-vitamin D3 250 mg-5 mcg (200 unit) Tab Take 1 tablet by mouth once daily.      esomeprazole (NEXIUM) 20 MG capsule Take 20 mg by mouth once daily.      losartan (COZAAR) 50 MG tablet Take 1 tablet by mouth once daily 90 tablet 0    niacin (NIASPAN) 1000 MG CR tablet Take 1 tablet by mouth nightly 90 tablet 0    omega-3 fatty acids/fish oil (FISH OIL-OMEGA-3 FATTY ACIDS) 300-1,000 mg capsule Take 4 g by mouth once daily at 6am.      paroxetine (PAXIL) 20 MG tablet Take 1 tablet by mouth once daily 90 tablet 0    [DISCONTINUED] gemfibroziL (LOPID) 600 MG tablet Take 1 tablet by mouth twice daily 180 tablet 0    [DISCONTINUED] metoprolol succinate (TOPROL-XL) 25 MG 24 hr tablet Take 1 tablet by mouth once daily 90 tablet 0       Social History     Socioeconomic History    Marital status:    Occupational History    Occupation: retired   Tobacco Use    Smoking status: Former     Types: Cigarettes    Smokeless tobacco: Never    Tobacco comments:     Pt states he "smokes a little weed every now and then"   Substance and Sexual Activity    Alcohol use: Yes     Comment: 4-5 a day    Drug use: " Yes     Types: Marijuana        Family History   Problem Relation Name Age of Onset    Diabetes Mother      Heart attack Mother      Alcohol abuse Father      Alcohol abuse Sister      Alcohol abuse Brother      Heart attack Brother          Patient Care Team:  Vivienne Russell MD as PCP - General (Family Medicine)  Vivienne Russell MD Rochon, Brent J., MD as Consulting Physician (Cardiology)  Trever Vaughan MD as Consulting Physician (Cardiothoracic Surgery)  Umm Parikh LPN as Care Coordinator  Jason Clifford MD as Consulting Physician (Neurology)  Chuck Matthews MD as Consulting Physician (Cardiothoracic Surgery)       Subjective:     Review of Systems   Constitutional: Negative.    HENT: Negative.     Eyes: Negative.    Respiratory: Negative.     Cardiovascular: Negative.    Gastrointestinal: Negative.    Genitourinary:  Negative for dysuria, flank pain, frequency, hematuria and urgency.        +genital itch  - gential lesion  - penile discharge   Musculoskeletal: Negative.    Skin: Negative.    Neurological: Negative.    Endo/Heme/Allergies: Negative.    Psychiatric/Behavioral: Negative.           Patient Reported Health Risk Assessment  [age]  Are you male or female?: Male  During the past four weeks, how much have you been bothered by emotional problems such as feeling anxious, depressed, irritable, sad, or downhearted and blue?: Not at all  During the past five weeks, has your physical and/or emotional health limited your social activities with family, friends, neighbors, or groups?: Not at all  During the past four weeks, how much bodily pain have you generally had?: No pain  During the past four weeks, was someone available to help if you needed and wanted help?: Yes, as much as I wanted  During the past four weeks, what was the hardest physical activity you could do for at least two minutes?: Light  Can you get to places out of walking distance without help?  (For  example, can you travel alone on buses or taxis, or drive your own car?): Yes  Can you go shopping for groceries or clothes without someone's help?: Yes  Can you prepare your own meals?: Yes  Can you do your own housework without help?: Yes  Because of any health problems, do you need the help of another person with your personal care needs such as eating, bathing, dressing, or getting around the house?: No  Can you handle your own money without help?: Yes  During the past four weeks, how would you rate your health in general?: Good  How have things been going for you during the past four weeks?: Good and bad parts about equal  Are you having difficulties driving your car?: No  Do you always fasten your seat belt when you are in a car?: Yes, usually  How often in the past four weeks have you been bothered by falling or dizzy when standing up?: Sometimes  How often in the past four weeks have you been bothered by sexual problems?: Never  How often in the past four weeks have you been bothered by trouble eating well?: Never  How often in the past four weeks have you been bothered by teeth or denture problems?: Never  How often in the past four weeks have you been bothered with problems using the telephone?: Never  How often in the past four weeks have you been bothered by tiredness or fatigue?: Never  Have you fallen two or more times in the past year?: No  Are you afraid of falling?: No  Are you a smoker?: No  During the past four weeks, how many drinks of wine, beer, or other alcoholic beverages did you have?: 2-5 drinks per weeks  Do you exercise for about 20 minutes three or more days a week?: No, I usually do not exercise this much  Have you been given any information to help you with hazards in your house that might hurt you?: No  Have you been given any information to help you with keeping track of your medications?: No  How often do you have trouble taking medicines the way you've been told to take them?: I  "always take them as prescribed  How confident are you that you can control and manage most of your health problems?: Very confident  What is your race? (Check all that apply.):     Objective:     /82 (BP Location: Left arm)   Pulse 92   Temp 97.9 °F (36.6 °C) (Temporal)   Resp 16   Ht 5' 5" (1.651 m)   Wt 74.1 kg (163 lb 6.4 oz)   SpO2 97%   BMI 27.19 kg/m²     Physical Exam  Vitals and nursing note reviewed.   Constitutional:       Appearance: Normal appearance. He is normal weight.   HENT:      Head: Normocephalic.      Nose: Nose normal.      Mouth/Throat:      Mouth: Mucous membranes are moist.      Pharynx: Oropharynx is clear.   Eyes:      Extraocular Movements: Extraocular movements intact.   Cardiovascular:      Rate and Rhythm: Normal rate and regular rhythm.   Pulmonary:      Effort: Pulmonary effort is normal.      Breath sounds: Normal breath sounds.   Musculoskeletal:         General: Normal range of motion.      Comments: Ambulates with cane   Skin:     General: Skin is warm and dry.   Neurological:      General: No focal deficit present.      Mental Status: He is alert and oriented to person, place, and time. Mental status is at baseline.   Psychiatric:         Mood and Affect: Mood normal.                No data to display                  7/16/2024     9:30 AM 12/7/2023    10:40 AM 7/12/2023     9:45 AM 11/17/2022     8:30 AM 7/8/2022     9:45 AM 6/9/2022     1:00 PM   Fall Risk Assessment - Outpatient   Mobility Status Ambulatory Ambulatory Ambulatory Ambulatory Ambulatory Ambulatory w/ assistance   Number of falls 0 0 1 0 1 with injury 1   Identified as fall risk False False False False True False           Depression Screening  Over the past two weeks, has the patient felt down, depressed, or hopeless?: No  Over the past two weeks, has the patient felt little interest or pleasure in doing things?: No  Functional Ability/Safety Screening  Was the patient's timed Up & Go test " unsteady or longer than 30 seconds?: No  Does the patient need help with phone, transportation, shopping, preparing meals, housework, laundry, meds, or managing money?: No  Does the patient's home have rugs in the hallway, lack grab bars in the bathroom, lack handrails on the stairs or have poor lighting?: No  Have you noticed any hearing difficulties?: No  Cognitive Function (Assessed through direct observation with due consideration of information obtained by way of patient reports and/or concerns raised by family, friends, caretakers, or others)    Does the patient repeat questions/statements in the same day?: No  Does the patient have trouble remembering the date, year, and time?: No  Does the patient have difficulty managing finances?: No  Does the patient have a decreased sense of direction?: No  Assessment/Plan:       Medicare Annual Wellness and Personalized Prevention Plan:   Fall Risk + Home Safety + Hearing Impairment + Depression Screen + Cognitive Impairment Screen + Health Risk Assessment all reviewed.     Opioid Screening: Patient medication list reviewed, patient is not taking prescription opioids. Patient is not using additional opioids than prescribed. Patient is at low risk of substance abuse based on this opioid use history.         Health Maintenance Topics with due status: Not Due       Topic Last Completion Date    Influenza Vaccine 12/20/2019    TETANUS VACCINE 07/08/2021    High Dose Statin 12/07/2023    Aspirin/Antiplatelet Therapy 12/07/2023    Lipid Panel 07/09/2024      The patient's Health Maintenance was reviewed and the following appears to be due at this time:   Health Maintenance Due   Topic Date Due    Colorectal Cancer Screening  Never done    Abdominal Aortic Aneurysm Screening  Never done    COVID-19 Vaccine (3 - 2023-24 season) 09/01/2023         1. Medicare annual wellness visit, subsequent  Assessment & Plan:  Previously done labs reviewed with patient at time of appointment  today  Prostate Specific Antigen (ng/mL)   Date Value   07/09/2024 0.56       Will request colonoscopy report again but also place referral to GI for screening colonoscopy since we have not received report despite multiple requests    Advanced care planning discussed and paperwork given    Orders:  -     CBC Auto Differential; Future; Expected date: 07/16/2025  -     Comprehensive Metabolic Panel; Future; Expected date: 07/16/2025  -     Lipid Panel; Future; Expected date: 07/16/2025  -     TSH; Future; Expected date: 07/16/2025  -     Hemoglobin A1C; Future; Expected date: 07/16/2025  -     Urinalysis; Future; Expected date: 07/16/2025  -     PSA, Screening; Future; Expected date: 07/16/2025  -     Vitamin D; Future; Expected date: 07/16/2025    2. Advanced care planning/counseling discussion  Assessment & Plan:  Advanced care planning discussed and paperwork given.    I attest that I have had a face to face discussion with patient and or surrogate decision maker.   Included surrogate decision maker: NO  Advanced directive in chart: NO  LAPOST: NO    Total time spent: 16 minutes      Orders:  -     CBC Auto Differential; Future; Expected date: 07/16/2025  -     Comprehensive Metabolic Panel; Future; Expected date: 07/16/2025  -     Lipid Panel; Future; Expected date: 07/16/2025  -     TSH; Future; Expected date: 07/16/2025  -     Hemoglobin A1C; Future; Expected date: 07/16/2025  -     Urinalysis; Future; Expected date: 07/16/2025  -     PSA, Screening; Future; Expected date: 07/16/2025  -     Vitamin D; Future; Expected date: 07/16/2025    3. Primary hypertension  Assessment & Plan:  Stable on current prescription. On asa. Keep appointments with cards    Orders:  -     CBC Auto Differential; Future; Expected date: 07/16/2025  -     Comprehensive Metabolic Panel; Future; Expected date: 07/16/2025  -     Lipid Panel; Future; Expected date: 07/16/2025  -     TSH; Future; Expected date: 07/16/2025  -     Hemoglobin  "A1C; Future; Expected date: 07/16/2025  -     Urinalysis; Future; Expected date: 07/16/2025  -     PSA, Screening; Future; Expected date: 07/16/2025  -     Vitamin D; Future; Expected date: 07/16/2025  -     metoprolol succinate (TOPROL-XL) 25 MG 24 hr tablet; Take 1 tablet (25 mg total) by mouth once daily.  Dispense: 90 tablet; Refill: 3    4. Mixed hyperlipidemia  Assessment & Plan:  Stable on current prescription.  Keep appointments with cards    Lab Results   Component Value Date    CHOL 162 07/09/2024    CHOL 150 07/08/2023    CHOL 201 (H) 06/30/2022     Lab Results   Component Value Date    HDL 64 (H) 07/09/2024    HDL 57 07/08/2023    HDL 56 06/30/2022     No results found for: "LDLCALC"  Lab Results   Component Value Date    TRIG 49 07/09/2024    TRIG 69 07/08/2023    TRIG 130 06/30/2022       No results found for: "CHOLHDL"      Orders:  -     CBC Auto Differential; Future; Expected date: 07/16/2025  -     Comprehensive Metabolic Panel; Future; Expected date: 07/16/2025  -     Lipid Panel; Future; Expected date: 07/16/2025  -     TSH; Future; Expected date: 07/16/2025  -     Hemoglobin A1C; Future; Expected date: 07/16/2025  -     Urinalysis; Future; Expected date: 07/16/2025  -     PSA, Screening; Future; Expected date: 07/16/2025  -     Vitamin D; Future; Expected date: 07/16/2025  -     gemfibroziL (LOPID) 600 MG tablet; Take 1 tablet (600 mg total) by mouth 2 (two) times daily.  Dispense: 180 tablet; Refill: 3    5. Coronary artery disease, unspecified vessel or lesion type, unspecified whether angina present, unspecified whether native or transplanted heart  Assessment & Plan:  Stable on current prescription. On asa. Keep appointments with cards    Orders:  -     CBC Auto Differential; Future; Expected date: 07/16/2025  -     Comprehensive Metabolic Panel; Future; Expected date: 07/16/2025  -     Lipid Panel; Future; Expected date: 07/16/2025  -     TSH; Future; Expected date: 07/16/2025  -     " Hemoglobin A1C; Future; Expected date: 07/16/2025  -     Urinalysis; Future; Expected date: 07/16/2025  -     PSA, Screening; Future; Expected date: 07/16/2025  -     Vitamin D; Future; Expected date: 07/16/2025    6. Bilateral carotid artery stenosis  Overview:  Patient is status post carotid endarterectomy doing well without complaints    Assessment & Plan:  Stable. Previously saw dr. Vaughan. Now seeing dr. gonzalez    Orders:  -     CBC Auto Differential; Future; Expected date: 07/16/2025  -     Comprehensive Metabolic Panel; Future; Expected date: 07/16/2025  -     Lipid Panel; Future; Expected date: 07/16/2025  -     TSH; Future; Expected date: 07/16/2025  -     Hemoglobin A1C; Future; Expected date: 07/16/2025  -     Urinalysis; Future; Expected date: 07/16/2025  -     PSA, Screening; Future; Expected date: 07/16/2025  -     Vitamin D; Future; Expected date: 07/16/2025    7. History of myocardial infarction  Assessment & Plan:  Stable. Keep appts with cards    Orders:  -     CBC Auto Differential; Future; Expected date: 07/16/2025  -     Comprehensive Metabolic Panel; Future; Expected date: 07/16/2025  -     Lipid Panel; Future; Expected date: 07/16/2025  -     TSH; Future; Expected date: 07/16/2025  -     Hemoglobin A1C; Future; Expected date: 07/16/2025  -     Urinalysis; Future; Expected date: 07/16/2025  -     PSA, Screening; Future; Expected date: 07/16/2025  -     Vitamin D; Future; Expected date: 07/16/2025    8. History of CVA (cerebrovascular accident)  Assessment & Plan:  States after MI.  Was previously depakote and saw neuro- dr. Clifford.  Weaned himself off meds >1 year ago.  Denies tremors.  Ambulates with cane. Fall precautions discussed    Orders:  -     CBC Auto Differential; Future; Expected date: 07/16/2025  -     Comprehensive Metabolic Panel; Future; Expected date: 07/16/2025  -     Lipid Panel; Future; Expected date: 07/16/2025  -     TSH; Future; Expected date: 07/16/2025  -     Hemoglobin  A1C; Future; Expected date: 07/16/2025  -     Urinalysis; Future; Expected date: 07/16/2025  -     PSA, Screening; Future; Expected date: 07/16/2025  -     Vitamin D; Future; Expected date: 07/16/2025    9. Depressive disorder  Assessment & Plan:  Stable on paxil    Orders:  -     CBC Auto Differential; Future; Expected date: 07/16/2025  -     Comprehensive Metabolic Panel; Future; Expected date: 07/16/2025  -     Lipid Panel; Future; Expected date: 07/16/2025  -     TSH; Future; Expected date: 07/16/2025  -     Hemoglobin A1C; Future; Expected date: 07/16/2025  -     Urinalysis; Future; Expected date: 07/16/2025  -     PSA, Screening; Future; Expected date: 07/16/2025  -     Vitamin D; Future; Expected date: 07/16/2025    10. Low vitamin D level  Assessment & Plan:  Continue to supplement otc. Level 7/2024 wnl    Orders:  -     CBC Auto Differential; Future; Expected date: 07/16/2025  -     Comprehensive Metabolic Panel; Future; Expected date: 07/16/2025  -     Lipid Panel; Future; Expected date: 07/16/2025  -     TSH; Future; Expected date: 07/16/2025  -     Hemoglobin A1C; Future; Expected date: 07/16/2025  -     Urinalysis; Future; Expected date: 07/16/2025  -     PSA, Screening; Future; Expected date: 07/16/2025  -     Vitamin D; Future; Expected date: 07/16/2025    11. Vitamin D deficiency, unspecified  -     Vitamin D; Future; Expected date: 07/16/2025    12. Gastroesophageal reflux disease, unspecified whether esophagitis present  Assessment & Plan:  Stable on ppi    Orders:  -     CBC Auto Differential; Future; Expected date: 07/16/2025  -     Comprehensive Metabolic Panel; Future; Expected date: 07/16/2025  -     Lipid Panel; Future; Expected date: 07/16/2025  -     TSH; Future; Expected date: 07/16/2025  -     Hemoglobin A1C; Future; Expected date: 07/16/2025  -     Urinalysis; Future; Expected date: 07/16/2025  -     PSA, Screening; Future; Expected date: 07/16/2025  -     Vitamin D; Future; Expected  date: 07/16/2025    13. Colon cancer screening  Assessment & Plan:  We have requested record from dr. Sharath levin in [place] multiple times and not gotten record.  Will place referral for colonoscopy.     Orders:  -     CBC Auto Differential; Future; Expected date: 07/16/2025  -     Comprehensive Metabolic Panel; Future; Expected date: 07/16/2025  -     Lipid Panel; Future; Expected date: 07/16/2025  -     TSH; Future; Expected date: 07/16/2025  -     Hemoglobin A1C; Future; Expected date: 07/16/2025  -     Urinalysis; Future; Expected date: 07/16/2025  -     PSA, Screening; Future; Expected date: 07/16/2025  -     Vitamin D; Future; Expected date: 07/16/2025  -     Ambulatory referral/consult to Gastroenterology; Future; Expected date: 07/23/2024    14. Prostate cancer screening  -     CBC Auto Differential; Future; Expected date: 07/16/2025  -     Comprehensive Metabolic Panel; Future; Expected date: 07/16/2025  -     Lipid Panel; Future; Expected date: 07/16/2025  -     TSH; Future; Expected date: 07/16/2025  -     Hemoglobin A1C; Future; Expected date: 07/16/2025  -     Urinalysis; Future; Expected date: 07/16/2025  -     PSA, Screening; Future; Expected date: 07/16/2025  -     Vitamin D; Future; Expected date: 07/16/2025    15. Itching of male genitalia  Assessment & Plan:  Pt is concerned for std.  Will get urine GC/Chlam/trich testing today.  Will call with results when available. Abstain from sexual activity until results known and treated if needed. Patient is agreeable to plan and verbalized understanding    Orders:  -     Chlamydia/GC, PCR  -     Trichomonas vaginalis Amplified RNA  -     Urinalysis       Separate complaint outside of wellness visit  CC: C/o genital itch that will no go away x couple of months.  No discharge.  No dysuria.  Is sexually active. Concerned for std.  Never std before. No one told him to get checked. No lesions.     ROS: negative other than above  PE: negative other than  above  A/P: Pt is concerned for std.  Will get urine GC/Chlam/trich testing today.  Will call with results when available. Abstain from sexual activity until results known and treated if needed. Patient is agreeable to plan and verbalized understanding             Advance Care Planning   I attest to discussing Advance Care Planning with patient and/or family member.  Education was provided including the importance of the Health Care Power of , Advance Directives, and/or [place] documentation.  The patient expressed understanding to the importance of this information and discussion.  Length of ACP conversation in minutes: 16         Medication List with Changes/Refills   Current Medications    ASPIRIN (ECOTRIN) 81 MG EC TABLET    Take 81 mg by mouth once daily.       Start Date: --        End Date: --    ATORVASTATIN (LIPITOR) 10 MG TABLET    Take 10 mg by mouth once daily at 6am.       Start Date: --        End Date: --    CA COMB NO.1/VIT D3/B6/FA/B12 (HEARTBURN & ACID REFLUX ORAL)    Take by mouth. Unknown of medication name       Start Date: --        End Date: --    CALCIUM CITRATE-VITAMIN D3 250 MG-5 MCG (200 UNIT) TAB    Take 1 tablet by mouth once daily.       Start Date: --        End Date: --    ESOMEPRAZOLE (NEXIUM) 20 MG CAPSULE    Take 20 mg by mouth once daily.       Start Date: --        End Date: --    LOSARTAN (COZAAR) 50 MG TABLET    Take 1 tablet by mouth once daily       Start Date: 5/30/2024 End Date: --    NIACIN (NIASPAN) 1000 MG CR TABLET    Take 1 tablet by mouth nightly       Start Date: 9/14/2023 End Date: --    OMEGA-3 FATTY ACIDS/FISH OIL (FISH OIL-OMEGA-3 FATTY ACIDS) 300-1,000 MG CAPSULE    Take 4 g by mouth once daily at 6am.       Start Date: --        End Date: --    PAROXETINE (PAXIL) 20 MG TABLET    Take 1 tablet by mouth once daily       Start Date: 6/4/2024  End Date: --   Changed and/or Refilled Medications    Modified Medication Previous Medication    GEMFIBROZIL (LOPID)  600 MG TABLET gemfibroziL (LOPID) 600 MG tablet       Take 1 tablet (600 mg total) by mouth 2 (two) times daily.    Take 1 tablet by mouth twice daily       Start Date: 7/16/2024 End Date: 7/16/2025    Start Date: 2/19/2024 End Date: 7/16/2024    METOPROLOL SUCCINATE (TOPROL-XL) 25 MG 24 HR TABLET metoprolol succinate (TOPROL-XL) 25 MG 24 hr tablet       Take 1 tablet (25 mg total) by mouth once daily.    Take 1 tablet by mouth once daily       Start Date: 7/16/2024 End Date: 7/16/2025    Start Date: 2/19/2024 End Date: 7/16/2024   Discontinued Medications    FLUTICASONE PROPIONATE (FLONASE) 50 MCG/ACTUATION NASAL SPRAY    1 spray (50 mcg total) by Each Nostril route once daily.       Start Date: 6/15/2023 End Date: 7/16/2024        Follow up in about 1 year (around 7/16/2025) for Medicare Wellness with labs. In addition to their scheduled follow up, the patient has also been instructed to follow up on as needed basis.

## 2024-07-16 NOTE — ASSESSMENT & PLAN NOTE
Previously done labs reviewed with patient at time of appointment today  Prostate Specific Antigen (ng/mL)   Date Value   07/09/2024 0.56       Will request colonoscopy report again but also place referral to GI for screening colonoscopy since we have not received report despite multiple requests    Advanced care planning discussed and paperwork given

## 2024-07-16 NOTE — ASSESSMENT & PLAN NOTE
We have requested record from dr. Sharath levin in [place] multiple times and not gotten record.  Will place referral for colonoscopy.

## 2024-07-16 NOTE — ASSESSMENT & PLAN NOTE
States after MI.  Was previously depakote and saw neuro- dr. Clifford.  Weaned himself off meds >1 year ago.  Denies tremors.  Ambulates with cane. Fall precautions discussed

## 2024-07-16 NOTE — ASSESSMENT & PLAN NOTE
Pt is concerned for std.  Will get urine GC/Chlam/trich testing today.  Will call with results when available. Abstain from sexual activity until results known and treated if needed. Patient is agreeable to plan and verbalized understanding

## 2024-07-17 LAB
SPECIMEN SOURCE: NORMAL
T VAGINALIS RRNA SPEC QL NAA+PROBE: NEGATIVE

## 2024-07-18 NOTE — PROGRESS NOTES
Please inform patient of results.    1. Ua has trace protein. Encourage fluids and good bp control.  No sign of infection  2. Urine was negative for gonorrhea, chlamydia, and trichomonas    He can try topical cream otc or po antihistamine for itching symptoms. Monitor. Contact clinic for concerns TEAM HEALTH PROGRESS NOTE


Chief Complaint


Chief Complaint


Acute pancreatiti


Duodenal and jejunal intraluminal lipomas


Hyponatremia


Hyperglycemia 


Celiac disease 


Adult onset stills disease


Anemi


Asthma


NATO on CPAP


GERD





History of Present Illness


History of Present Illness


5/27/2020


Patient seen and examined


Discussed with RN


Chart reviewed





Vitals/I&O


Vitals/I&O:





                                   Vital Signs








  Date Time  Temp Pulse Resp B/P (MAP) Pulse Ox O2 Delivery O2 Flow Rate FiO2


 


5/27/20 12:17      Room Air  


 


5/27/20 11:24 98.0 96 16 116/66 (83) 91   





 98.0       














                                    I & O   


 


 5/26/20 5/26/20 5/27/20





 15:00 23:00 07:00


 


Intake Total 1000 ml 50 ml 1000 ml


 


Balance 1000 ml 50 ml 1000 ml











Physical Exam


General:  Alert, Oriented X3, Cooperative, moderate distress


Abdomen:  Normal bowel sounds, Soft, No hepatosplenomegaly, No masses, Other 

(Diffuse tenderness)


Extremities:  No clubbing, No cyanosis, No edema, Normal pulses, No 

tenderness/swelling


Skin:  No rashes, No breakdown, No significant lesion





Labs


Labs:





Laboratory Tests








Test


 5/26/20


16:28 5/26/20


18:04 5/26/20


19:04 5/26/20


20:03


 


Glucose (Fingerstick)


 391 mg/dL


(70-99) 339 mg/dL


(70-99) 338 mg/dL


(70-99) 315 mg/dL


(70-99)


 


Test


 5/26/20


21:06 5/26/20


22:00 5/26/20


23:00 5/27/20


00:00


 


Glucose (Fingerstick)


 278 mg/dL


(70-99) 229 mg/dL


(70-99) 190 mg/dL


(70-99) 182 mg/dL


(70-99)


 


Test


 5/27/20


00:50 5/27/20


02:03 5/27/20


03:05 5/27/20


04:00


 


Glucose (Fingerstick)


 174 mg/dL


(70-99) 142 mg/dL


(70-99) 137 mg/dL


(70-99) 





 


White Blood Count


 


 


 


 4.5 x10^3/uL


(4.0-11.0)


 


Red Blood Count


 


 


 


 4.87 x10^6/uL


(3.50-5.40)


 


Hemoglobin


 


 


 


 13.8 g/dL


(12.0-15.5)


 


Hematocrit


 


 


 


 40.4 %


(36.0-47.0)


 


Mean Corpuscular Volume    83 fL () 


 


Mean Corpuscular Hemoglobin    28 pg (25-35) 


 


Mean Corpuscular Hemoglobin


Concent 


 


 


 34 g/dL


(31-37)


 


Red Cell Distribution Width


 


 


 


 16.2 %


(11.5-14.5)


 


Platelet Count


 


 


 


 217 x10^3/uL


(140-400)


 


Neutrophils (%) (Auto)    66 % (31-73) 


 


Lymphocytes (%) (Auto)    17 % (24-48) 


 


Monocytes (%) (Auto)    16 % (0-9) 


 


Eosinophils (%) (Auto)    0 % (0-3) 


 


Basophils (%) (Auto)    1 % (0-3) 


 


Neutrophils # (Auto)


 


 


 


 2.9 x10^3/uL


(1.8-7.7)


 


Lymphocytes # (Auto)


 


 


 


 0.8 x10^3/uL


(1.0-4.8)


 


Monocytes # (Auto)


 


 


 


 0.7 x10^3/uL


(0.0-1.1)


 


Eosinophils # (Auto)


 


 


 


 0.0 x10^3/uL


(0.0-0.7)


 


Basophils # (Auto)


 


 


 


 0.0 x10^3/uL


(0.0-0.2)


 


Sodium Level


 


 


 


 139 mmol/L


(136-145)


 


Potassium Level


 


 


 


 4.2 mmol/L


(3.5-5.1)


 


Chloride Level


 


 


 


 106 mmol/L


()


 


Carbon Dioxide Level


 


 


 


 25 mmol/L


(21-32)


 


Anion Gap    8 (6-14) 


 


Blood Urea Nitrogen


 


 


 


 16 mg/dL


(7-20)


 


Creatinine


 


 


 


 0.8 mg/dL


(0.6-1.0)


 


Estimated GFR


(Cockcroft-Gault) 


 


 


 73.7 





 


BUN/Creatinine Ratio    20 (6-20) 


 


Glucose Level


 


 


 


 215 mg/dL


(70-99)


 


Calcium Level


 


 


 


 7.8 mg/dL


(8.5-10.1)


 


Total Bilirubin


 


 


 


 0.8 mg/dL


(0.2-1.0)


 


Aspartate Amino Transf


(AST/SGOT) 


 


 


 22 U/L (15-37) 





 


Alanine Aminotransferase


(ALT/SGPT) 


 


 


 30 U/L (14-59) 





 


Alkaline Phosphatase


 


 


 


 78 U/L


()


 


Total Protein


 


 


 


 6.4 g/dL


(6.4-8.2)


 


Albumin


 


 


 


 2.9 g/dL


(3.4-5.0)


 


Albumin/Globulin Ratio    0.8 (1.0-1.7) 


 


Lipase


 


 


 


 2384 U/L


()


 


Test


 5/27/20


06:52 5/27/20


11:12 


 





 


Glucose (Fingerstick)


 273 mg/dL


(70-99) 257 mg/dL


(70-99) 


 














Assessment and Plan


Assessmemt and Plan


Problems


Medical Problems:


(1) Dehydration


Status: Acute  





(2) Hyperglycemia


Status: Acute  





(3) Pancreatitis, acute


Status: Acute  


Acute pancreatitis - with N/V, abdominal pain and elevated lipase, CT confirmed.

Will keep NPO, pain control. IV fluids. Consult GI. Hold methotrexate. Check 

triglycerides


Duodenal and jejunal intraluminal lipomas - GI consulted, uncertain clinical 

signficance


Hyponatremia - likely from hyperglycemia


Hyperglycemia - given her steroids and methotrexate therapy is at risk for 

developing diabetes, will check A1c, sliding scale q4hrs


Celiac disease - on diet outpatient, NPO for now


Adult onset stills disease - on MTX 20mg weekly on Fridays, Prednisone 3mg 

daily.


Anemia - likely related to chronic disease.


Asthma - prn nebs


NATO on CPAP - can bring her home CPAP vs O2 nocturnal


GERD - PPI IV








Comment


Review of Relevant


I have reviewed the following items elizabeth (where applicable) has been applied.


Medications:





Current Medications








 Medications


  (Trade)  Dose


 Ordered  Sig/Jean


 Route


 PRN Reason  Start Time


 Stop Time Status Last Admin


Dose Admin


 


 Morphine Sulfate


  (Morphine


 Sulfate)  4 mg  PRN Q2HR  PRN


 IV


 MODERATE TO SEVERE PAIN  5/26/20 14:45


    5/27/20 09:09





 


 Sodium Chloride  1,000 ml @ 


 100 mls/hr  Q10H


 IV


   5/26/20 14:44


    5/27/20 12:17





 


 Enoxaparin Sodium


  (Lovenox 40mg


 Syringe)  40 mg  Q24H


 SQ


   5/26/20 15:00


    5/26/20 15:50





 


 Insulin Human


 Lispro


  (HumaLOG)  0-9 UNITS  Q4HRS


 SQ


   5/26/20 16:00


    5/27/20 12:21





 


 Methylprednisolone


 Sodium Succinate


  (SOLU-Medrol


 40MG VIAL)  40 mg  DAILY


 IV


   5/26/20 15:15


    5/27/20 09:10





 


 Insulin Human


 Regular 100 unit/


 Sodium Chloride  101 ml @ 0


 mls/hr  CONT  PRN


 IV


 SEE I/O RECORD  5/26/20 18:00


    5/27/20 02:12





 


 Fentanyl Citrate


  (Fentanyl 2ml


 Vial)  50 mcg  PRN Q2HR  PRN


 IVP


 PAIN  5/27/20 11:45


    5/27/20 12:17




















DAYDAY ORTIZ III DO           May 27, 2020 12:40

## 2024-09-04 DIAGNOSIS — Z00.00 MEDICARE ANNUAL WELLNESS VISIT, SUBSEQUENT: ICD-10-CM

## 2024-09-04 DIAGNOSIS — E78.5 HYPERLIPIDEMIA, UNSPECIFIED HYPERLIPIDEMIA TYPE: ICD-10-CM

## 2024-09-04 DIAGNOSIS — R79.89 LOW VITAMIN D LEVEL: ICD-10-CM

## 2024-09-04 RX ORDER — PAROXETINE HYDROCHLORIDE 20 MG/1
20 TABLET, FILM COATED ORAL
Qty: 90 TABLET | Refills: 3 | Status: SHIPPED | OUTPATIENT
Start: 2024-09-04

## 2024-09-10 RX ORDER — LOSARTAN POTASSIUM 50 MG/1
50 TABLET ORAL
Qty: 90 TABLET | Refills: 0 | Status: SHIPPED | OUTPATIENT
Start: 2024-09-10

## 2024-10-21 PROBLEM — Z00.00 MEDICARE ANNUAL WELLNESS VISIT, SUBSEQUENT: Status: RESOLVED | Noted: 2022-07-08 | Resolved: 2024-10-21

## 2024-12-11 RX ORDER — LOSARTAN POTASSIUM 50 MG/1
50 TABLET ORAL
Qty: 90 TABLET | Refills: 2 | Status: SHIPPED | OUTPATIENT
Start: 2024-12-11

## 2024-12-12 ENCOUNTER — OFFICE VISIT (OUTPATIENT)
Dept: CARDIAC SURGERY | Facility: CLINIC | Age: 74
End: 2024-12-12
Payer: MEDICARE

## 2024-12-12 VITALS
HEIGHT: 65 IN | DIASTOLIC BLOOD PRESSURE: 97 MMHG | BODY MASS INDEX: 27.22 KG/M2 | OXYGEN SATURATION: 98 % | WEIGHT: 163.38 LBS | HEART RATE: 98 BPM | SYSTOLIC BLOOD PRESSURE: 192 MMHG

## 2024-12-12 DIAGNOSIS — I65.23 BILATERAL CAROTID ARTERY STENOSIS: Primary | ICD-10-CM

## 2025-01-14 ENCOUNTER — DOCUMENTATION ONLY (OUTPATIENT)
Dept: FAMILY MEDICINE | Facility: CLINIC | Age: 75
End: 2025-01-14
Payer: MEDICARE

## 2025-01-14 LAB — CRC RECOMMENDATION EXT: NORMAL

## 2025-07-08 DIAGNOSIS — I10 PRIMARY HYPERTENSION: ICD-10-CM

## 2025-07-08 RX ORDER — METOPROLOL SUCCINATE 25 MG/1
25 TABLET, EXTENDED RELEASE ORAL
Qty: 90 TABLET | Refills: 2 | Status: SHIPPED | OUTPATIENT
Start: 2025-07-08

## 2025-07-15 ENCOUNTER — TELEPHONE (OUTPATIENT)
Dept: FAMILY MEDICINE | Facility: CLINIC | Age: 75
End: 2025-07-15
Payer: MEDICARE

## 2025-07-15 NOTE — TELEPHONE ENCOUNTER
Copied from CRM #[id number]. Topic: General Inquiry - Patient Advice  >> Jul 15, 2025 10:38 AM Tova Arechiga wrote:  Who Called: Joao Ignacio Jr    What order is the patient requesting: Wellness lab orders   When does the expect the orders to be performed? today        Preferred Method of Contact: Phone Call  Patient's Preferred Phone Number on File: [phone]   Best Call Back Number, if different:  Additional Information: pt would like a call back once orders are in

## 2025-07-16 ENCOUNTER — LAB VISIT (OUTPATIENT)
Dept: LAB | Facility: HOSPITAL | Age: 75
End: 2025-07-16
Attending: FAMILY MEDICINE
Payer: MEDICARE

## 2025-07-16 DIAGNOSIS — R79.89 LOW VITAMIN D LEVEL: ICD-10-CM

## 2025-07-16 DIAGNOSIS — I10 PRIMARY HYPERTENSION: ICD-10-CM

## 2025-07-16 DIAGNOSIS — K21.9 GASTROESOPHAGEAL REFLUX DISEASE, UNSPECIFIED WHETHER ESOPHAGITIS PRESENT: ICD-10-CM

## 2025-07-16 DIAGNOSIS — I25.2 HISTORY OF MYOCARDIAL INFARCTION: ICD-10-CM

## 2025-07-16 DIAGNOSIS — Z86.73 HISTORY OF CVA (CEREBROVASCULAR ACCIDENT): ICD-10-CM

## 2025-07-16 DIAGNOSIS — I25.10 CORONARY ARTERY DISEASE, UNSPECIFIED VESSEL OR LESION TYPE, UNSPECIFIED WHETHER ANGINA PRESENT, UNSPECIFIED WHETHER NATIVE OR TRANSPLANTED HEART: ICD-10-CM

## 2025-07-16 DIAGNOSIS — Z00.00 MEDICARE ANNUAL WELLNESS VISIT, SUBSEQUENT: ICD-10-CM

## 2025-07-16 DIAGNOSIS — Z12.11 COLON CANCER SCREENING: ICD-10-CM

## 2025-07-16 DIAGNOSIS — E78.2 MIXED HYPERLIPIDEMIA: ICD-10-CM

## 2025-07-16 DIAGNOSIS — F32.A DEPRESSIVE DISORDER: ICD-10-CM

## 2025-07-16 DIAGNOSIS — E55.9 VITAMIN D DEFICIENCY, UNSPECIFIED: ICD-10-CM

## 2025-07-16 DIAGNOSIS — Z12.5 PROSTATE CANCER SCREENING: ICD-10-CM

## 2025-07-16 DIAGNOSIS — I65.23 BILATERAL CAROTID ARTERY STENOSIS: ICD-10-CM

## 2025-07-16 DIAGNOSIS — Z71.89 ADVANCED CARE PLANNING/COUNSELING DISCUSSION: ICD-10-CM

## 2025-07-16 LAB
25(OH)D3+25(OH)D2 SERPL-MCNC: 29 NG/ML (ref 30–80)
ALBUMIN SERPL-MCNC: 4 G/DL (ref 3.4–4.8)
ALBUMIN/GLOB SERPL: 1 RATIO (ref 1.1–2)
ALP SERPL-CCNC: 72 UNIT/L (ref 40–150)
ALT SERPL-CCNC: 12 UNIT/L (ref 0–55)
ANION GAP SERPL CALC-SCNC: 9 MEQ/L
AST SERPL-CCNC: 22 UNIT/L (ref 11–45)
BASOPHILS # BLD AUTO: 0.01 X10(3)/MCL
BASOPHILS NFR BLD AUTO: 0.2 %
BILIRUB SERPL-MCNC: 0.7 MG/DL
BUN SERPL-MCNC: 19.7 MG/DL (ref 8.4–25.7)
CALCIUM SERPL-MCNC: 9.1 MG/DL (ref 8.8–10)
CHLORIDE SERPL-SCNC: 107 MMOL/L (ref 98–107)
CHOLEST SERPL-MCNC: 173 MG/DL
CHOLEST/HDLC SERPL: 3 {RATIO} (ref 0–5)
CO2 SERPL-SCNC: 26 MMOL/L (ref 23–31)
CREAT SERPL-MCNC: 1.01 MG/DL (ref 0.72–1.25)
CREAT/UREA NIT SERPL: 20
EOSINOPHIL # BLD AUTO: 0.45 X10(3)/MCL (ref 0–0.9)
EOSINOPHIL NFR BLD AUTO: 6.8 %
ERYTHROCYTE [DISTWIDTH] IN BLOOD BY AUTOMATED COUNT: 12.8 % (ref 11.5–17)
EST. AVERAGE GLUCOSE BLD GHB EST-MCNC: 105.4 MG/DL
GFR SERPLBLD CREATININE-BSD FMLA CKD-EPI: >60 ML/MIN/1.73/M2
GLOBULIN SER-MCNC: 4.2 GM/DL (ref 2.4–3.5)
GLUCOSE SERPL-MCNC: 97 MG/DL (ref 82–115)
HBA1C MFR BLD: 5.3 %
HCT VFR BLD AUTO: 42.5 % (ref 42–52)
HDLC SERPL-MCNC: 53 MG/DL (ref 35–60)
HGB BLD-MCNC: 14.5 G/DL (ref 14–18)
IMM GRANULOCYTES # BLD AUTO: 0.02 X10(3)/MCL (ref 0–0.04)
IMM GRANULOCYTES NFR BLD AUTO: 0.3 %
LDLC SERPL CALC-MCNC: 104 MG/DL (ref 50–140)
LYMPHOCYTES # BLD AUTO: 1.33 X10(3)/MCL (ref 0.6–4.6)
LYMPHOCYTES NFR BLD AUTO: 20 %
MCH RBC QN AUTO: 31 PG (ref 27–31)
MCHC RBC AUTO-ENTMCNC: 34.1 G/DL (ref 33–36)
MCV RBC AUTO: 91 FL (ref 80–94)
MONOCYTES # BLD AUTO: 0.49 X10(3)/MCL (ref 0.1–1.3)
MONOCYTES NFR BLD AUTO: 7.4 %
NEUTROPHILS # BLD AUTO: 4.34 X10(3)/MCL (ref 2.1–9.2)
NEUTROPHILS NFR BLD AUTO: 65.3 %
NRBC BLD AUTO-RTO: 0 %
PLATELET # BLD AUTO: 217 X10(3)/MCL (ref 130–400)
PMV BLD AUTO: 10.3 FL (ref 7.4–10.4)
POTASSIUM SERPL-SCNC: 4.1 MMOL/L (ref 3.5–5.1)
PROT SERPL-MCNC: 8.2 GM/DL (ref 5.8–7.6)
PSA SERPL-MCNC: 0.49 NG/ML
RBC # BLD AUTO: 4.67 X10(6)/MCL (ref 4.7–6.1)
SODIUM SERPL-SCNC: 142 MMOL/L (ref 136–145)
TRIGL SERPL-MCNC: 78 MG/DL (ref 34–140)
TSH SERPL-ACNC: 1.48 UIU/ML (ref 0.35–4.94)
VLDLC SERPL CALC-MCNC: 16 MG/DL
WBC # BLD AUTO: 6.64 X10(3)/MCL (ref 4.5–11.5)

## 2025-07-16 PROCEDURE — 82306 VITAMIN D 25 HYDROXY: CPT

## 2025-07-16 PROCEDURE — 80061 LIPID PANEL: CPT

## 2025-07-16 PROCEDURE — 36415 COLL VENOUS BLD VENIPUNCTURE: CPT

## 2025-07-16 PROCEDURE — 83036 HEMOGLOBIN GLYCOSYLATED A1C: CPT

## 2025-07-16 PROCEDURE — 85025 COMPLETE CBC W/AUTO DIFF WBC: CPT

## 2025-07-16 PROCEDURE — 84443 ASSAY THYROID STIM HORMONE: CPT

## 2025-07-16 PROCEDURE — 84153 ASSAY OF PSA TOTAL: CPT

## 2025-07-16 PROCEDURE — 80053 COMPREHEN METABOLIC PANEL: CPT

## 2025-07-23 ENCOUNTER — OFFICE VISIT (OUTPATIENT)
Dept: FAMILY MEDICINE | Facility: CLINIC | Age: 75
End: 2025-07-23
Payer: MEDICARE

## 2025-07-23 VITALS
OXYGEN SATURATION: 98 % | DIASTOLIC BLOOD PRESSURE: 88 MMHG | HEART RATE: 67 BPM | SYSTOLIC BLOOD PRESSURE: 148 MMHG | WEIGHT: 155.31 LBS | HEIGHT: 65 IN | BODY MASS INDEX: 25.88 KG/M2 | RESPIRATION RATE: 16 BRPM | TEMPERATURE: 98 F

## 2025-07-23 DIAGNOSIS — Z12.11 COLON CANCER SCREENING: ICD-10-CM

## 2025-07-23 DIAGNOSIS — K21.9 GASTROESOPHAGEAL REFLUX DISEASE, UNSPECIFIED WHETHER ESOPHAGITIS PRESENT: ICD-10-CM

## 2025-07-23 DIAGNOSIS — Z00.00 MEDICARE ANNUAL WELLNESS VISIT, SUBSEQUENT: Primary | ICD-10-CM

## 2025-07-23 DIAGNOSIS — Z71.89 ADVANCED CARE PLANNING/COUNSELING DISCUSSION: ICD-10-CM

## 2025-07-23 DIAGNOSIS — I25.10 CORONARY ARTERY DISEASE, UNSPECIFIED VESSEL OR LESION TYPE, UNSPECIFIED WHETHER ANGINA PRESENT, UNSPECIFIED WHETHER NATIVE OR TRANSPLANTED HEART: ICD-10-CM

## 2025-07-23 DIAGNOSIS — F32.A DEPRESSIVE DISORDER: ICD-10-CM

## 2025-07-23 DIAGNOSIS — I69.344 MONOPLEGIA OF LOWER LIMB FOLLOWING CEREBRAL INFARCTION AFFECTING LEFT NON-DOMINANT SIDE: ICD-10-CM

## 2025-07-23 DIAGNOSIS — E78.2 MIXED HYPERLIPIDEMIA: ICD-10-CM

## 2025-07-23 DIAGNOSIS — I10 PRIMARY HYPERTENSION: ICD-10-CM

## 2025-07-23 DIAGNOSIS — I65.23 BILATERAL CAROTID ARTERY STENOSIS: ICD-10-CM

## 2025-07-23 DIAGNOSIS — R79.89 LOW VITAMIN D LEVEL: ICD-10-CM

## 2025-07-23 DIAGNOSIS — Z86.73 HISTORY OF CVA (CEREBROVASCULAR ACCIDENT): ICD-10-CM

## 2025-07-23 DIAGNOSIS — I25.2 HISTORY OF MYOCARDIAL INFARCTION: ICD-10-CM

## 2025-07-23 PROBLEM — L29.3 ITCHING OF MALE GENITALIA: Status: RESOLVED | Noted: 2024-07-16 | Resolved: 2025-07-23

## 2025-07-23 PROBLEM — S22.42XA MULTIPLE CLOSED FRACTURES OF RIBS OF LEFT SIDE: Status: RESOLVED | Noted: 2022-06-09 | Resolved: 2025-07-23

## 2025-07-23 RX ORDER — NIACIN 1000 MG/1
1000 TABLET, EXTENDED RELEASE ORAL NIGHTLY
Qty: 90 TABLET | Refills: 3 | Status: SHIPPED | OUTPATIENT
Start: 2025-07-23 | End: 2026-07-23

## 2025-07-23 RX ORDER — FLUOROURACIL 50 MG/G
CREAM TOPICAL 2 TIMES DAILY
COMMUNITY
Start: 2025-06-26

## 2025-07-23 NOTE — ASSESSMENT & PLAN NOTE
Previously done labs reviewed with patient at time of appointment today  Prostate Specific Antigen (ng/mL)   Date Value   07/16/2025 0.49       Colonoscopy 1/2025 with dr. Whalen with 3 year repeat    Advanced care planning discussed and paperwork given

## 2025-07-23 NOTE — PROGRESS NOTES
Internal Medicine      Patient ID: [id number]     Chief Complaint: Medicare Annual Wellness     HPI:     Joao Ignacio Jr is a 75 y.o. male here today for a Medicare Annual Wellness visit and comprehensive Health Risk Assessment.     presents to the clinic unaccompanied for his wellness visit. he did labs already.  they were reviewed with the patient at the time of his appt today.       Patient has hypertension, HLD, CAD, carotid stenosis as well as history of MI. Denies PVD. He reports he saw Dr. Vaughan for right carotid artery occlusion. Now seeing dr. Matthews. Has appt 12/2024. Sees him annually.  Does US. He monitors his blood pressures at home and states they are normally 130s/70s. He takes his metoprolol BID and his losartan 25mg in the morning from dr. samayoa. He is on a baby aspirin. his cardiologist Dr. Samayoa. he sees him annually. LOV 12/2024.      He also has a history of mini strokes after his heart attack. Has some residual left leg weakness. Ambulates with cane. He is no longer on Depakote or valproic acid. he stopped valproic acid > 1 year ago (weaned himself off). denies tremors. feeling well. He saw Dr. Clifford. does not have scheduled follow up.     He also has depression and is on Paxil. He is currently taking half of a 20 mg tablet (10mg) daily. He is happy with his current dosing. He states that the higher dose made him jittery.     Has hypovitaminosis D.  Supplements otc.  Level 7/2024 was low normal.      He has acid reflux and is on nexium 20mg.    Follows with dr. Bond.  No history of skin cancer.       The patient reports that he is not smoking cigarettes. He drinks alcohol on occasion. He is smoking pot sometimes.     He is UTD on his pneumonia vaccinations. declines shingles today. Colonoscopy 1/2025 with dr. Whalen.  Had 1 polyp. Repeat in 3 years.      He is not allergic to any medications.              Health Maintenance         Date Due Completion Date    Abdominal Aortic Aneurysm  "Screening Never done ---    COVID-19 Vaccine (3 - 2024-25 season) 09/01/2024 3/2/2021    RSV Vaccine (Age 60+ and Pregnant patients) (1 - Risk 60-74 years 1-dose series) 07/23/2025 (Originally 1/9/2011) ---    Shingles Vaccine (1 of 2) 07/23/2026 (Originally 1/9/2001) ---    Influenza Vaccine (1) 09/01/2025 12/20/2019    High Dose Statin 07/23/2026 7/23/2025    Aspirin/Antiplatelet Therapy 07/23/2026 7/23/2025    Colorectal Cancer Screening 01/14/2028 1/14/2025    Lipid Panel 07/16/2030 7/16/2025    TETANUS VACCINE 07/08/2031 7/8/2021             Past Medical History:   Diagnosis Date    Acid reflux     CAD (coronary artery disease)     Carotid stenosis     History of CVA (cerebrovascular accident)     History of MI (myocardial infarction)     Hyperlipidemia     Hypertension         Past Surgical History:   Procedure Laterality Date    ANGIOGRAM, CORONARY, WITH LEFT HEART CATHETERIZATION      CAROTID ARTERY ENDARTERECTOMY  10/14/2016    COLONOSCOPY      CORONARY ARTERY BYPASS GRAFT      HERNIA REPAIR      RT ELBOW SURGERY      TRIPLE BYPASS HEART SURGERY          Social History     Socioeconomic History    Marital status:    Occupational History    Occupation: retired   Tobacco Use    Smoking status: Former     Types: Cigarettes    Smokeless tobacco: Never    Tobacco comments:     Pt states he "smokes a little weed every now and then"   Substance and Sexual Activity    Alcohol use: Yes     Comment: 4-5 a day    Drug use: Yes     Types: Marijuana        Family History   Problem Relation Name Age of Onset    Diabetes Mother      Heart attack Mother      Alcohol abuse Father      Alcohol abuse Sister      Alcohol abuse Brother      Heart attack Brother          Current Outpatient Medications   Medication Instructions    aspirin (ECOTRIN) 81 mg, Daily    atorvastatin (LIPITOR) 10 mg, Daily    Ca comb no.1/vit D3/B6/FA/B12 (HEARTBURN & ACID REFLUX ORAL) Take by mouth. Unknown of medication name    calcium " citrate-vitamin D3 250 mg-5 mcg (200 unit) Tab 1 tablet, Daily    esomeprazole (NEXIUM) 20 mg, Daily    fluorouraciL (EFUDEX) 5 % cream 2 times daily    gemfibroziL (LOPID) 600 mg, Oral, 2 times daily    losartan (COZAAR) 50 mg, Oral    metoprolol succinate (TOPROL-XL) 25 mg, Oral    niacin (NIASPAN) 1,000 mg, Oral, Nightly    omega-3 fatty acids/fish oil (FISH OIL-OMEGA-3 FATTY ACIDS) 300-1,000 mg capsule 4 g, Daily    paroxetine (PAXIL) 20 mg, Oral       Review of patient's allergies indicates:  No Known Allergies     Immunization History   Administered Date(s) Administered    COVID-19, MRNA, LN-S, PF (MODERNA FULL 0.5 ML DOSE) 02/02/2021, 03/02/2021    Influenza - Trivalent - Fluarix, Flulaval, Fluzone, Afluria - PF 10/31/2017, 11/15/2018, 12/20/2019    Pneumococcal Conjugate - 13 Valent 05/15/2018    Pneumococcal Polysaccharide - 23 Valent 11/28/2018    Tdap 07/08/2021        Patient Care Team:  Vivienne Russell MD as PCP - General (Family Medicine)  Harrison Samayoa MD as Consulting Physician (Cardiology)  Trever Vaughan MD (Inactive) as Consulting Physician (Cardiothoracic Surgery)  Umm Parikh LPN as Care Coordinator  Jason Clifford MD as Consulting Physician (Neurology)  Chuck Matthews MD as Consulting Physician (Cardiothoracic Surgery)  Jayme Bond MD as Consulting Physician (Dermatology)  Jaswant Whalen MD as Consulting Physician (Gastroenterology)    Subjective:     Review of Systems   Constitutional: Negative.    HENT: Negative.     Eyes: Negative.    Respiratory: Negative.     Cardiovascular: Negative.    Gastrointestinal: Negative.    Endocrine: Negative.    Genitourinary: Negative.    Musculoskeletal: Negative.    Skin: Negative.    Allergic/Immunologic: Negative.    Neurological: Negative.    Hematological: Negative.    Psychiatric/Behavioral: Negative.     All other systems reviewed and are negative.      12 point review of systems conducted, negative except as stated in the  "history of present illness. See HPI for details.    Objective:     Visit Vitals  BP (!) 148/88   Pulse 67   Temp 98.2 °F (36.8 °C) (Oral)   Resp 16   Ht 5' 5" (1.651 m)   Wt 70.4 kg (155 lb 4.8 oz)   SpO2 98%   BMI 25.84 kg/m²       Physical Exam  Vitals and nursing note reviewed.   Constitutional:       Appearance: Normal appearance. He is normal weight.   HENT:      Head: Normocephalic.      Nose: Nose normal.      Mouth/Throat:      Mouth: Mucous membranes are moist.      Pharynx: Oropharynx is clear.   Eyes:      Extraocular Movements: Extraocular movements intact.   Cardiovascular:      Rate and Rhythm: Normal rate and regular rhythm.   Pulmonary:      Effort: Pulmonary effort is normal.      Breath sounds: Normal breath sounds.   Musculoskeletal:         General: Normal range of motion.   Skin:     General: Skin is warm and dry.   Neurological:      General: No focal deficit present.      Mental Status: He is alert and oriented to person, place, and time. Mental status is at baseline.   Psychiatric:         Mood and Affect: Mood normal.         Assessment:       ICD-10-CM ICD-9-CM   1. Medicare annual wellness visit, subsequent  Z00.00 V70.0   2. Advanced care planning/counseling discussion  Z71.89 V65.49   3. Primary hypertension  I10 401.9   4. Mixed hyperlipidemia  E78.2 272.2   5. History of myocardial infarction  I25.2 412   6. Coronary artery disease, unspecified vessel or lesion type, unspecified whether angina present, unspecified whether native or transplanted heart  I25.10 414.00   7. Bilateral carotid artery stenosis  I65.23 433.10     433.30   8. Low vitamin D level  R79.89 790.6   9. Depressive disorder  F32.A 311   10. History of CVA (cerebrovascular accident)  Z86.73 V12.54   11. Gastroesophageal reflux disease, unspecified whether esophagitis present  K21.9 530.81   12. Colon cancer screening  Z12.11 V76.51   13. 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 Medicare annual wellness visit, subsequent  Assessment & Plan:  Previously done labs reviewed with patient at time of appointment today  Prostate Specific Antigen (ng/mL)   Date Value   07/16/2025 0.49       Colonoscopy 1/2025 with dr. Whalen with 3 year repeat    Advanced care planning discussed and paperwork given      2. Advanced care planning/counseling discussion  Assessment & Plan:  Advanced care planning discussed and paperwork given.    I attest that I have had a face to face discussion with patient and or surrogate decision maker.   Included surrogate decision maker: NO  Advanced directive in chart: NO  LAPOST: NO    Total time spent: 16 minutes        3. Primary hypertension  Assessment & Plan:  Initial reading elevated. Repeat improved but not to goal.  Asymptomatic.   continue on current prescriptions- metoprolol and lostartan. On asa. Monitor bp at home. Follow up in 2 weeks for bp check.   Keep appointments with cards      4. Mixed hyperlipidemia  Assessment & Plan:  Stable on current prescriptions- on statin and lopid and niacin and fish oil.  Keep appointments with cards    Lab Results   Component Value Date    CHOL 173 07/16/2025    CHOL 162 07/09/2024    CHOL 150 07/08/2023     Lab Results   Component Value Date    HDL 53 07/16/2025    HDL 64 (H) 07/09/2024    HDL 57 07/08/2023     No results found for: "LDLCALC"  Lab Results   Component Value Date    TRIG 78 07/16/2025    TRIG 49 07/09/2024    TRIG 69 07/08/2023       No results found for: "CHOLHDL"      Orders:  -     niacin (NIASPAN) 1000 MG CR tablet; Take 1 tablet (1,000 mg total) by mouth every evening.  Dispense: 90 tablet; Refill: 3    5. History of myocardial infarction  Assessment & Plan:  Stable. Keep appts with cards      6. Coronary artery disease, unspecified vessel or lesion type, unspecified whether angina present, unspecified whether native or transplanted heart  Assessment & Plan:  Stable " on current prescriptions. On asa. Keep appointments with cards      7. Bilateral carotid artery stenosis  Overview:  Patient is status post carotid endarterectomy doing well without complaints    Assessment & Plan:  Stable. Previously saw dr. Vaughan. Now seeing dr. Matthews. On statin and asa      8. Low vitamin D level  Assessment & Plan:  Continue to supplement otc. Level 7/2025 low      9. Depressive disorder  Assessment & Plan:  Stable on paxil      10. History of CVA (cerebrovascular accident)  Assessment & Plan:  States after MI.  Was previously depakote and saw neuro- dr. Clifford.  Weaned himself off meds >1 year ago.  Denies tremors.  Ambulates with cane. Fall precautions discussed      11. Gastroesophageal reflux disease, unspecified whether esophagitis present  Assessment & Plan:  Stable on ppi      12. Colon cancer screening  Assessment & Plan:  Colonoscopy 1/2025 with dr. Whalen with 3 year repeat      13. 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 Results are summarized below:    There are NO EMOTIONAL/SOCIAL CONCERNS identified on today's screening for Social Isolation, Depression and Anxiety.    There are NO COGNITIVE FUNCTION CONCERNS identified on today's screening.  The following FUNCTIONAL AND/OR SAFETY CONCERNS were identified on today's screening for Physical Symptoms, Nutritional, Home Safety/Living Situation, Fall Risk, Activities of Daily Living, Independent Activities of Daily Living, Physical Activity,Timed Up and Go test and Whisper test::   *Patient reports he NEEDS AMBULATION ASSISTANCE. (Do you use an assistance device to easily get around?: (!) Yes)(Ambulation Assistance: Cane)     The patient reports NO OPIOID PRESCRIPTIONS. This was confirmed through medication reconciliation.    The patient is NOT A TOBACCO USER.     The patient  has DRUG USE: He reports current drug use. Drug: Marijuana.    All Questions regarding food, transportation or housing were not answered today.    The patient was asked and declined the use of a free .    Advance Care Planning   Today we discussed advance care planning. He is interested in learning more about how to make Advance Directives. Information was provided and I offered to discuss more at his discretion.         Provided patient with a 5-10 year written screening schedule and personal prevention plan. Recommendations were developed using the USPSTF age appropriate recommendations. Education, counseling, and referrals were provided as needed. After Visit Summary printed and given to patient, which includes a list of additional screenings\tests needed.    Follow up in about 1 year (around 7/23/2026) for Medicare Wellness with labs. In addition to their scheduled follow up, the patient has also been instructed to follow up on as needed basis.     Future Appointments   Date Time Provider Department Center   8/6/2025  1:00 PM Bri Kilpatrick FNP [facility]   12/11/2025 10:00 AM ULTRASOUND, [facility] [address] [facility] [place]   12/11/2025 10:40 AM Chuck Matthews MD [facility] [place]   7/27/2026  1:00 PM Vivienne Russell MD [facility] MD

## 2025-07-23 NOTE — ASSESSMENT & PLAN NOTE
Initial reading elevated. Repeat improved but not to goal.  Asymptomatic.   continue on current prescriptions- metoprolol and lostartan. On asa. Monitor bp at home. Follow up in 2 weeks for bp check.   Keep appointments with cards

## 2025-07-23 NOTE — ASSESSMENT & PLAN NOTE
"Stable on current prescriptions- on statin and lopid and niacin and fish oil.  Keep appointments with cards    Lab Results   Component Value Date    CHOL 173 07/16/2025    CHOL 162 07/09/2024    CHOL 150 07/08/2023     Lab Results   Component Value Date    HDL 53 07/16/2025    HDL 64 (H) 07/09/2024    HDL 57 07/08/2023     No results found for: "LDLCALC"  Lab Results   Component Value Date    TRIG 78 07/16/2025    TRIG 49 07/09/2024    TRIG 69 07/08/2023       No results found for: "CHOLHDL"    "

## 2025-08-06 ENCOUNTER — OFFICE VISIT (OUTPATIENT)
Dept: FAMILY MEDICINE | Facility: CLINIC | Age: 75
End: 2025-08-06
Payer: MEDICARE

## 2025-08-06 VITALS
HEIGHT: 65 IN | SYSTOLIC BLOOD PRESSURE: 125 MMHG | DIASTOLIC BLOOD PRESSURE: 66 MMHG | TEMPERATURE: 97 F | WEIGHT: 157.69 LBS | RESPIRATION RATE: 18 BRPM | HEART RATE: 69 BPM | BODY MASS INDEX: 26.27 KG/M2 | OXYGEN SATURATION: 98 %

## 2025-08-06 DIAGNOSIS — I10 PRIMARY HYPERTENSION: Primary | ICD-10-CM

## 2025-08-06 PROCEDURE — 4010F ACE/ARB THERAPY RXD/TAKEN: CPT | Mod: CPTII,,,

## 2025-08-06 PROCEDURE — 3044F HG A1C LEVEL LT 7.0%: CPT | Mod: CPTII,,,

## 2025-08-06 PROCEDURE — 1126F AMNT PAIN NOTED NONE PRSNT: CPT | Mod: CPTII,,,

## 2025-08-06 PROCEDURE — 3008F BODY MASS INDEX DOCD: CPT | Mod: CPTII,,,

## 2025-08-06 PROCEDURE — 3074F SYST BP LT 130 MM HG: CPT | Mod: CPTII,,,

## 2025-08-06 PROCEDURE — 3288F FALL RISK ASSESSMENT DOCD: CPT | Mod: CPTII,,,

## 2025-08-06 PROCEDURE — 99213 OFFICE O/P EST LOW 20 MIN: CPT | Mod: ,,,

## 2025-08-06 PROCEDURE — 1160F RVW MEDS BY RX/DR IN RCRD: CPT | Mod: CPTII,,,

## 2025-08-06 PROCEDURE — 1101F PT FALLS ASSESS-DOCD LE1/YR: CPT | Mod: CPTII,,,

## 2025-08-06 PROCEDURE — 1159F MED LIST DOCD IN RCRD: CPT | Mod: CPTII,,,

## 2025-08-06 PROCEDURE — 3078F DIAST BP <80 MM HG: CPT | Mod: CPTII,,,

## 2025-08-06 NOTE — ASSESSMENT & PLAN NOTE
Stable on losartan. Keep appts with cardiology and pcp as scheduled. ER precautions. Keep blood pressure log and bring to all appts. Contact clinic for concerns. He is agreeable to plan and verbalizes understanding.

## 2025-08-06 NOTE — PROGRESS NOTES
Family Medicine  Bri Kilpatrick, [facility]    Patient ID: [id number]     Chief Complaint: Follow-up (2 week BP follow up)      HPI:     Patient presents to the clinic unaccompanied for a blood pressure check. His blood pressure readings were elevated at his wellness visit two weeks ago. At that time no medication adjustments were made. He states his blood pressure is elevated in the mornings when he wakes up but after he takes his medication it returns to normal. No symptoms when his blood pressure is elevated. He is currently on losartan 50 mg daily. He sees Dr. Samayoa. Blood pressure within normal limits in clinic today.     Patient has hypertension, HLD, CAD, carotid stenosis as well as history of MI. Denies PVD. He reports he saw Dr. Vaughan for right carotid artery occlusion. Now seeing dr. Matthews. Has appt 12/2024. Sees him annually.  Does US. He monitors his blood pressures at home and states they are normally 130s/70s. He takes his metoprolol BID and his losartan 25mg in the morning from dr. samayoa. He is on a baby aspirin. his cardiologist Dr. Samayoa. he sees him annually. LOV 12/2024.      He also has a history of mini strokes after his heart attack. Has some residual left leg weakness. Ambulates with cane. He is no longer on Depakote or valproic acid. he stopped valproic acid > 1 year ago (weaned himself off). denies tremors. feeling well. He saw Dr. Clifford. does not have scheduled follow up.     He also has depression and is on Paxil. He is currently taking half of a 20 mg tablet (10mg) daily. He is happy with his current dosing. He states that the higher dose made him jittery.     Has hypovitaminosis D.  Supplements otc.  Level 7/2024 was low normal.      He has acid reflux and is on nexium 20mg.     Follows with dr. Bond.  No history of skin cancer.       The patient reports that he is not smoking cigarettes. He drinks alcohol on occasion. He is smoking pot sometimes.     He is UTD on his pneumonia  "vaccinations. declines shingles today. Colonoscopy 1/2025 with dr. Whalen.  Had 1 polyp. Repeat in 3 years.      He is not allergic to any medications.        Past Medical History:   Diagnosis Date    Acid reflux     CAD (coronary artery disease)     Carotid stenosis     History of CVA (cerebrovascular accident)     History of MI (myocardial infarction)     Hyperlipidemia     Hypertension         Past Surgical History:   Procedure Laterality Date    ANGIOGRAM, CORONARY, WITH LEFT HEART CATHETERIZATION      CAROTID ARTERY ENDARTERECTOMY  10/14/2016    COLONOSCOPY      CORONARY ARTERY BYPASS GRAFT      HERNIA REPAIR      RT ELBOW SURGERY      TRIPLE BYPASS HEART SURGERY          Social History     Tobacco Use    Smoking status: Former     Types: Cigarettes    Smokeless tobacco: Never    Tobacco comments:     Pt states he "smokes a little weed every now and then"   Substance and Sexual Activity    Alcohol use: Yes     Comment: 4-5 a day    Drug use: Yes     Types: Marijuana    Sexual activity: Not on file        Current Outpatient Medications   Medication Instructions    aspirin (ECOTRIN) 81 mg, Daily    atorvastatin (LIPITOR) 10 mg, Daily    Ca comb no.1/vit D3/B6/FA/B12 (HEARTBURN & ACID REFLUX ORAL) Take by mouth. Unknown of medication name    calcium citrate-vitamin D3 250 mg-5 mcg (200 unit) Tab 1 tablet, Daily    esomeprazole (NEXIUM) 20 mg, Daily    fluorouraciL (EFUDEX) 5 % cream 2 times daily    gemfibroziL (LOPID) 600 mg, Oral, 2 times daily    losartan (COZAAR) 50 mg, Oral    metoprolol succinate (TOPROL-XL) 25 mg, Oral    niacin (NIASPAN) 1,000 mg, Oral, Nightly    omega-3 fatty acids/fish oil (FISH OIL-OMEGA-3 FATTY ACIDS) 300-1,000 mg capsule 4 g, Daily    paroxetine (PAXIL) 20 mg, Oral       Review of patient's allergies indicates:  No Known Allergies     Patient Care Team:  Vivienne Russell MD as PCP - General (Family Medicine)  Harrison Samayoa MD as Consulting Physician (Cardiology)  Clement, " "Trever PLATT MD (Inactive) as Consulting Physician (Cardiothoracic Surgery)  Umm Parikh LPN as Care Coordinator  Jason Clifford MD as Consulting Physician (Neurology)  Chuck Matthews MD as Consulting Physician (Cardiothoracic Surgery)  Jayme Bond MD as Consulting Physician (Dermatology)  Jaswant Whalen MD as Consulting Physician (Gastroenterology)     Subjective:     Review of Systems   Constitutional: Negative.    HENT: Negative.     Eyes: Negative.    Respiratory: Negative.     Cardiovascular: Negative.    Gastrointestinal: Negative.    Endocrine: Negative.    Genitourinary: Negative.    Musculoskeletal: Negative.    Skin: Negative.    Allergic/Immunologic: Negative.    Neurological: Negative.    Hematological: Negative.    Psychiatric/Behavioral: Negative.             Objective:     Visit Vitals  /66 (BP Location: Left arm, Patient Position: Sitting)   Pulse 69   Temp 96.7 °F (35.9 °C) (Temporal)   Resp 18   Ht 5' 5" (1.651 m)   Wt 71.5 kg (157 lb 11.2 oz)   SpO2 98%   BMI 26.24 kg/m²       Physical Exam  Vitals and nursing note reviewed.   Constitutional:       General: He is not in acute distress.     Appearance: Normal appearance. He is not ill-appearing.   HENT:      Head: Normocephalic and atraumatic.      Mouth/Throat:      Mouth: Mucous membranes are moist.   Eyes:      Pupils: Pupils are equal, round, and reactive to light.   Cardiovascular:      Rate and Rhythm: Normal rate and regular rhythm.      Heart sounds: No murmur heard.     No friction rub. No gallop.   Pulmonary:      Effort: Pulmonary effort is normal. No respiratory distress.      Breath sounds: Normal breath sounds. No wheezing, rhonchi or rales.   Abdominal:      General: Abdomen is flat.      Palpations: Abdomen is soft.   Skin:     General: Skin is warm and dry.   Neurological:      General: No focal deficit present.      Mental Status: He is alert and oriented to person, place, and time.   Psychiatric:         Mood " and Affect: Mood normal.         Behavior: Behavior normal.         Labs Reviewed:     Chemistry:  Lab Results   Component Value Date     07/16/2025    K 4.1 07/16/2025    BUN 19.7 07/16/2025    CREATININE 1.01 07/16/2025    EGFRNORACEVR >60 07/16/2025    CALCIUM 9.1 07/16/2025    ALKPHOS 72 07/16/2025    ALBUMIN 4.0 07/16/2025    BILIDIR 0.2 07/01/2021    IBILI 0.40 07/01/2021    AST 22 07/16/2025    ALT 12 07/16/2025    [id number] 29 (L) 07/16/2025    TSH 1.484 07/16/2025    CISSFQ8ZHMA 0.69 (L) 05/22/2018    PSA 0.49 07/16/2025        Lab Results   Component Value Date    HGBA1C 5.3 07/16/2025        Hematology:  Lab Results   Component Value Date    WBC 6.64 07/16/2025    HGB 14.5 07/16/2025    HCT 42.5 07/16/2025     07/16/2025       Lipid Panel:  Lab Results   Component Value Date    CHOL 173 07/16/2025    HDL 53 07/16/2025    TRIG 78 07/16/2025    TOTALCHOLEST 3 07/16/2025        Urine:  Lab Results   Component Value Date    APPEARANCEUA Clear 07/16/2025    SGUA 1.010 07/16/2025    PROTEINUA Negative 07/16/2025    KETONESUA Negative 07/16/2025    LEUKOCYTESUR Negative 07/16/2025    RBCUA 0-5 07/16/2024    WBCUA 0-5 07/16/2024    BACTERIA None Seen 07/16/2024    SQEPUA Trace 07/16/2024        Assessment:       ICD-10-CM ICD-9-CM   1. Primary hypertension  I10 401.9        Plan:     1. Primary hypertension  Assessment & Plan:  Stable on losartan. Keep appts with cardiology and pcp as scheduled. ER precautions. Keep blood pressure log and bring to all appts. Contact clinic for concerns. He is agreeable to plan and verbalizes understanding.            Follow up for as previously scheduled with Dr. Russell. In addition to their scheduled follow up, the patient has also been instructed to follow up on as needed basis.     Future Appointments   Date Time Provider Department Center   12/11/2025 10:00 AM ULTRASOUND, [facility] [address] [facility] [place]   12/11/2025 10:40 AM Chuck Matthews MD [facility]   7/27/2026  1:00 PM Vivienne Russell MD [facility] [place]

## 2025-08-18 DIAGNOSIS — E78.5 HYPERLIPIDEMIA, UNSPECIFIED HYPERLIPIDEMIA TYPE: ICD-10-CM

## 2025-08-18 DIAGNOSIS — Z00.00 MEDICARE ANNUAL WELLNESS VISIT, SUBSEQUENT: ICD-10-CM

## 2025-08-18 DIAGNOSIS — R79.89 LOW VITAMIN D LEVEL: ICD-10-CM

## 2025-08-18 RX ORDER — PAROXETINE 20 MG/1
20 TABLET, FILM COATED ORAL
Qty: 90 TABLET | Refills: 3 | Status: SHIPPED | OUTPATIENT
Start: 2025-08-18

## 2025-08-31 DIAGNOSIS — I10 PRIMARY HYPERTENSION: Primary | ICD-10-CM

## 2025-09-02 RX ORDER — LOSARTAN POTASSIUM 50 MG/1
50 TABLET ORAL
Qty: 90 TABLET | Refills: 2 | Status: SHIPPED | OUTPATIENT
Start: 2025-09-02